# Patient Record
Sex: FEMALE | Race: WHITE | NOT HISPANIC OR LATINO | Employment: FULL TIME | URBAN - METROPOLITAN AREA
[De-identification: names, ages, dates, MRNs, and addresses within clinical notes are randomized per-mention and may not be internally consistent; named-entity substitution may affect disease eponyms.]

---

## 2022-11-27 ENCOUNTER — HOSPITAL ENCOUNTER (INPATIENT)
Facility: HOSPITAL | Age: 62
LOS: 4 days | Discharge: HOME HEALTH | DRG: 638 | End: 2022-12-01
Attending: EMERGENCY MEDICINE | Admitting: STUDENT IN AN ORGANIZED HEALTH CARE EDUCATION/TRAINING PROGRAM
Payer: COMMERCIAL

## 2022-11-27 DIAGNOSIS — E11.10 DIABETIC KETOACIDOSIS WITHOUT COMA ASSOCIATED WITH TYPE 2 DIABETES MELLITUS: Primary | ICD-10-CM

## 2022-11-27 DIAGNOSIS — I95.9 HYPOTENSION: ICD-10-CM

## 2022-11-27 DIAGNOSIS — R53.81 DEBILITY: ICD-10-CM

## 2022-11-27 DIAGNOSIS — Z90.710 STATUS POST HYSTERECTOMY: ICD-10-CM

## 2022-11-27 PROBLEM — E11.9 TYPE 2 DIABETES MELLITUS WITHOUT COMPLICATION, WITHOUT LONG-TERM CURRENT USE OF INSULIN: Status: ACTIVE | Noted: 2022-11-27

## 2022-11-27 PROBLEM — I10 ESSENTIAL HYPERTENSION: Status: ACTIVE | Noted: 2022-11-27

## 2022-11-27 PROBLEM — C54.1 ENDOMETRIAL ADENOCARCINOMA: Status: ACTIVE | Noted: 2022-11-27

## 2022-11-27 PROBLEM — R47.81 SLURRED SPEECH: Status: ACTIVE | Noted: 2022-11-27

## 2022-11-27 LAB
ABO + RH BLD: NORMAL
ALBUMIN SERPL BCP-MCNC: 4 G/DL (ref 3.5–5.2)
ALLENS TEST: ABNORMAL
ALLENS TEST: NORMAL
ALP SERPL-CCNC: 79 U/L (ref 55–135)
ALT SERPL W/O P-5'-P-CCNC: 22 U/L (ref 10–44)
ANION GAP SERPL CALC-SCNC: 19 MMOL/L (ref 8–16)
ANION GAP SERPL CALC-SCNC: 19 MMOL/L (ref 8–16)
ANION GAP SERPL CALC-SCNC: 22 MMOL/L (ref 8–16)
AST SERPL-CCNC: 9 U/L (ref 10–40)
B-OH-BUTYR BLD STRIP-SCNC: 5.3 MMOL/L (ref 0–0.5)
BACTERIA #/AREA URNS HPF: ABNORMAL /HPF
BASOPHILS # BLD AUTO: 0.05 K/UL (ref 0–0.2)
BASOPHILS NFR BLD: 0.4 % (ref 0–1.9)
BILIRUB SERPL-MCNC: 0.3 MG/DL (ref 0.1–1)
BILIRUB UR QL STRIP: NEGATIVE
BLD GP AB SCN CELLS X3 SERPL QL: NORMAL
BNP SERPL-MCNC: 10 PG/ML (ref 0–99)
BUN SERPL-MCNC: 25 MG/DL (ref 6–20)
BUN SERPL-MCNC: 30 MG/DL (ref 6–20)
BUN SERPL-MCNC: 32 MG/DL (ref 6–30)
CALCIUM SERPL-MCNC: 10.5 MG/DL (ref 8.7–10.5)
CALCIUM SERPL-MCNC: 11.4 MG/DL (ref 8.7–10.5)
CHLORIDE SERPL-SCNC: 113 MMOL/L (ref 95–110)
CHLORIDE SERPL-SCNC: 116 MMOL/L (ref 95–110)
CHLORIDE SERPL-SCNC: 120 MMOL/L (ref 95–110)
CLARITY UR: CLEAR
CO2 SERPL-SCNC: 10 MMOL/L (ref 23–29)
CO2 SERPL-SCNC: 7 MMOL/L (ref 23–29)
COLOR UR: YELLOW
CREAT SERPL-MCNC: 0.5 MG/DL (ref 0.5–1.4)
CREAT SERPL-MCNC: 1 MG/DL (ref 0.5–1.4)
CREAT SERPL-MCNC: 1.5 MG/DL (ref 0.5–1.4)
DIFFERENTIAL METHOD: ABNORMAL
EOSINOPHIL # BLD AUTO: 0.5 K/UL (ref 0–0.5)
EOSINOPHIL NFR BLD: 3.6 % (ref 0–8)
ERYTHROCYTE [DISTWIDTH] IN BLOOD BY AUTOMATED COUNT: 13.6 % (ref 11.5–14.5)
EST. GFR  (NO RACE VARIABLE): 40 ML/MIN/1.73 M^2
EST. GFR  (NO RACE VARIABLE): >60 ML/MIN/1.73 M^2
GLUCOSE SERPL-MCNC: 286 MG/DL (ref 70–110)
GLUCOSE SERPL-MCNC: 482 MG/DL (ref 70–110)
GLUCOSE SERPL-MCNC: 556 MG/DL (ref 70–110)
GLUCOSE UR QL STRIP: ABNORMAL
HCO3 UR-SCNC: 6.8 MMOL/L (ref 24–28)
HCT VFR BLD AUTO: 53.1 % (ref 37–48.5)
HCT VFR BLD CALC: 57 %PCV (ref 36–54)
HGB BLD-MCNC: 17.3 G/DL (ref 12–16)
HGB UR QL STRIP: ABNORMAL
HYALINE CASTS #/AREA URNS LPF: 4 /LPF
IMM GRANULOCYTES # BLD AUTO: 0.11 K/UL (ref 0–0.04)
IMM GRANULOCYTES NFR BLD AUTO: 0.9 % (ref 0–0.5)
INR PPP: 1 (ref 0.8–1.2)
KETONES UR QL STRIP: ABNORMAL
LACTATE SERPL-SCNC: 1.7 MMOL/L (ref 0.5–2.2)
LACTATE SERPL-SCNC: 2.2 MMOL/L (ref 0.5–2.2)
LDH SERPL L TO P-CCNC: 1.94 MMOL/L (ref 0.5–2.2)
LEUKOCYTE ESTERASE UR QL STRIP: ABNORMAL
LYMPHOCYTES # BLD AUTO: 0.9 K/UL (ref 1–4.8)
LYMPHOCYTES NFR BLD: 6.6 % (ref 18–48)
MAGNESIUM SERPL-MCNC: 2.4 MG/DL (ref 1.6–2.6)
MCH RBC QN AUTO: 30.5 PG (ref 27–31)
MCHC RBC AUTO-ENTMCNC: 32.6 G/DL (ref 32–36)
MCV RBC AUTO: 94 FL (ref 82–98)
MICROSCOPIC COMMENT: ABNORMAL
MONOCYTES # BLD AUTO: 0.7 K/UL (ref 0.3–1)
MONOCYTES NFR BLD: 5.4 % (ref 4–15)
NEUTROPHILS # BLD AUTO: 10.7 K/UL (ref 1.8–7.7)
NEUTROPHILS NFR BLD: 83.1 % (ref 38–73)
NITRITE UR QL STRIP: NEGATIVE
NRBC BLD-RTO: 0 /100 WBC
PCO2 BLDA: 23.3 MMHG (ref 35–45)
PH SMN: 7.08 [PH] (ref 7.35–7.45)
PH UR STRIP: 6 [PH] (ref 5–8)
PHOSPHATE SERPL-MCNC: 1.1 MG/DL (ref 2.7–4.5)
PLATELET # BLD AUTO: 406 K/UL (ref 150–450)
PMV BLD AUTO: 9.3 FL (ref 9.2–12.9)
PO2 BLDA: 25 MMHG (ref 40–60)
POC BE: -22 MMOL/L
POC IONIZED CALCIUM: 1.6 MMOL/L (ref 1.06–1.42)
POC SATURATED O2: 27 % (ref 95–100)
POC TCO2 (MEASURED): 9 MMOL/L (ref 23–29)
POC TCO2: 8 MMOL/L (ref 24–29)
POCT GLUCOSE: 330 MG/DL (ref 70–110)
POCT GLUCOSE: 407 MG/DL (ref 70–110)
POCT GLUCOSE: 432 MG/DL (ref 70–110)
POCT GLUCOSE: 433 MG/DL (ref 70–110)
POTASSIUM BLD-SCNC: 3.5 MMOL/L (ref 3.5–5.1)
POTASSIUM SERPL-SCNC: 3.3 MMOL/L (ref 3.5–5.1)
POTASSIUM SERPL-SCNC: 3.5 MMOL/L (ref 3.5–5.1)
PROCALCITONIN SERPL IA-MCNC: 0.05 NG/ML
PROT SERPL-MCNC: 8.3 G/DL (ref 6–8.4)
PROT UR QL STRIP: ABNORMAL
PROTHROMBIN TIME: 10.6 SEC (ref 9–12.5)
RBC # BLD AUTO: 5.67 M/UL (ref 4–5.4)
RBC #/AREA URNS HPF: 6 /HPF (ref 0–4)
SAMPLE: ABNORMAL
SAMPLE: ABNORMAL
SAMPLE: NORMAL
SITE: ABNORMAL
SITE: NORMAL
SODIUM BLD-SCNC: 143 MMOL/L (ref 136–145)
SODIUM SERPL-SCNC: 142 MMOL/L (ref 136–145)
SODIUM SERPL-SCNC: 145 MMOL/L (ref 136–145)
SP GR UR STRIP: 1.03 (ref 1–1.03)
SQUAMOUS #/AREA URNS HPF: 4 /HPF
TROPONIN I SERPL DL<=0.01 NG/ML-MCNC: 0.01 NG/ML (ref 0–0.03)
URN SPEC COLLECT METH UR: ABNORMAL
UROBILINOGEN UR STRIP-ACNC: NEGATIVE EU/DL
WBC # BLD AUTO: 12.91 K/UL (ref 3.9–12.7)
WBC #/AREA URNS HPF: 15 /HPF (ref 0–5)
YEAST URNS QL MICRO: ABNORMAL

## 2022-11-27 PROCEDURE — 25000003 PHARM REV CODE 250: Performed by: EMERGENCY MEDICINE

## 2022-11-27 PROCEDURE — 87086 URINE CULTURE/COLONY COUNT: CPT | Performed by: EMERGENCY MEDICINE

## 2022-11-27 PROCEDURE — 93010 EKG 12-LEAD: ICD-10-PCS | Mod: ,,, | Performed by: INTERNAL MEDICINE

## 2022-11-27 PROCEDURE — 83605 ASSAY OF LACTIC ACID: CPT

## 2022-11-27 PROCEDURE — 84100 ASSAY OF PHOSPHORUS: CPT | Performed by: STUDENT IN AN ORGANIZED HEALTH CARE EDUCATION/TRAINING PROGRAM

## 2022-11-27 PROCEDURE — 82803 BLOOD GASES ANY COMBINATION: CPT

## 2022-11-27 PROCEDURE — 82010 KETONE BODYS QUAN: CPT | Performed by: EMERGENCY MEDICINE

## 2022-11-27 PROCEDURE — 63600175 PHARM REV CODE 636 W HCPCS: Performed by: STUDENT IN AN ORGANIZED HEALTH CARE EDUCATION/TRAINING PROGRAM

## 2022-11-27 PROCEDURE — 82330 ASSAY OF CALCIUM: CPT

## 2022-11-27 PROCEDURE — 93005 ELECTROCARDIOGRAM TRACING: CPT

## 2022-11-27 PROCEDURE — 83605 ASSAY OF LACTIC ACID: CPT | Performed by: EMERGENCY MEDICINE

## 2022-11-27 PROCEDURE — 84145 PROCALCITONIN (PCT): CPT | Performed by: EMERGENCY MEDICINE

## 2022-11-27 PROCEDURE — 85610 PROTHROMBIN TIME: CPT | Performed by: EMERGENCY MEDICINE

## 2022-11-27 PROCEDURE — 99900035 HC TECH TIME PER 15 MIN (STAT)

## 2022-11-27 PROCEDURE — 80053 COMPREHEN METABOLIC PANEL: CPT | Performed by: EMERGENCY MEDICINE

## 2022-11-27 PROCEDURE — 87040 BLOOD CULTURE FOR BACTERIA: CPT | Mod: 59 | Performed by: EMERGENCY MEDICINE

## 2022-11-27 PROCEDURE — 87088 URINE BACTERIA CULTURE: CPT | Performed by: EMERGENCY MEDICINE

## 2022-11-27 PROCEDURE — 85025 COMPLETE CBC W/AUTO DIFF WBC: CPT | Performed by: EMERGENCY MEDICINE

## 2022-11-27 PROCEDURE — 83735 ASSAY OF MAGNESIUM: CPT | Performed by: STUDENT IN AN ORGANIZED HEALTH CARE EDUCATION/TRAINING PROGRAM

## 2022-11-27 PROCEDURE — 85014 HEMATOCRIT: CPT

## 2022-11-27 PROCEDURE — 80048 BASIC METABOLIC PNL TOTAL CA: CPT | Mod: XB | Performed by: STUDENT IN AN ORGANIZED HEALTH CARE EDUCATION/TRAINING PROGRAM

## 2022-11-27 PROCEDURE — 84132 ASSAY OF SERUM POTASSIUM: CPT

## 2022-11-27 PROCEDURE — 25000003 PHARM REV CODE 250: Performed by: STUDENT IN AN ORGANIZED HEALTH CARE EDUCATION/TRAINING PROGRAM

## 2022-11-27 PROCEDURE — 93010 ELECTROCARDIOGRAM REPORT: CPT | Mod: ,,, | Performed by: INTERNAL MEDICINE

## 2022-11-27 PROCEDURE — 87106 FUNGI IDENTIFICATION YEAST: CPT | Performed by: EMERGENCY MEDICINE

## 2022-11-27 PROCEDURE — 63600175 PHARM REV CODE 636 W HCPCS: Performed by: EMERGENCY MEDICINE

## 2022-11-27 PROCEDURE — S5010 5% DEXTROSE AND 0.45% SALINE: HCPCS | Performed by: STUDENT IN AN ORGANIZED HEALTH CARE EDUCATION/TRAINING PROGRAM

## 2022-11-27 PROCEDURE — 81000 URINALYSIS NONAUTO W/SCOPE: CPT | Performed by: EMERGENCY MEDICINE

## 2022-11-27 PROCEDURE — 83036 HEMOGLOBIN GLYCOSYLATED A1C: CPT | Performed by: EMERGENCY MEDICINE

## 2022-11-27 PROCEDURE — 82565 ASSAY OF CREATININE: CPT

## 2022-11-27 PROCEDURE — 20000000 HC ICU ROOM

## 2022-11-27 PROCEDURE — 84295 ASSAY OF SERUM SODIUM: CPT

## 2022-11-27 PROCEDURE — 84484 ASSAY OF TROPONIN QUANT: CPT | Performed by: EMERGENCY MEDICINE

## 2022-11-27 PROCEDURE — 83605 ASSAY OF LACTIC ACID: CPT | Mod: 91 | Performed by: STUDENT IN AN ORGANIZED HEALTH CARE EDUCATION/TRAINING PROGRAM

## 2022-11-27 PROCEDURE — 99291 CRITICAL CARE FIRST HOUR: CPT | Mod: 25

## 2022-11-27 PROCEDURE — 86850 RBC ANTIBODY SCREEN: CPT | Performed by: EMERGENCY MEDICINE

## 2022-11-27 PROCEDURE — 83880 ASSAY OF NATRIURETIC PEPTIDE: CPT | Performed by: EMERGENCY MEDICINE

## 2022-11-27 PROCEDURE — 96374 THER/PROPH/DIAG INJ IV PUSH: CPT

## 2022-11-27 RX ORDER — DEXTROSE MONOHYDRATE 100 MG/ML
INJECTION, SOLUTION INTRAVENOUS
Status: DISCONTINUED | OUTPATIENT
Start: 2022-11-27 | End: 2022-11-29

## 2022-11-27 RX ORDER — DEXTROSE MONOHYDRATE AND SODIUM CHLORIDE 5; .45 G/100ML; G/100ML
INJECTION, SOLUTION INTRAVENOUS CONTINUOUS
Status: ACTIVE | OUTPATIENT
Start: 2022-11-27 | End: 2022-11-29

## 2022-11-27 RX ORDER — SODIUM CHLORIDE 9 MG/ML
INJECTION, SOLUTION INTRAVENOUS CONTINUOUS
Status: DISCONTINUED | OUTPATIENT
Start: 2022-11-27 | End: 2022-11-27

## 2022-11-27 RX ORDER — POTASSIUM CHLORIDE 7.45 MG/ML
10 INJECTION INTRAVENOUS
Status: DISPENSED | OUTPATIENT
Start: 2022-11-27 | End: 2022-11-27

## 2022-11-27 RX ORDER — SODIUM CHLORIDE 9 MG/ML
INJECTION, SOLUTION INTRAVENOUS CONTINUOUS
Status: DISCONTINUED | OUTPATIENT
Start: 2022-11-27 | End: 2022-11-30

## 2022-11-27 RX ORDER — METFORMIN HYDROCHLORIDE 500 MG/1
500 TABLET, EXTENDED RELEASE ORAL
COMMUNITY
Start: 2022-11-16 | End: 2023-11-16

## 2022-11-27 RX ORDER — EMPAGLIFLOZIN 10 MG/1
10 TABLET, FILM COATED ORAL
COMMUNITY
Start: 2022-11-16

## 2022-11-27 RX ORDER — ASPIRIN 81 MG/1
81 TABLET ORAL DAILY
Status: DISCONTINUED | OUTPATIENT
Start: 2022-11-28 | End: 2022-12-01 | Stop reason: HOSPADM

## 2022-11-27 RX ORDER — VANCOMYCIN HCL IN 5 % DEXTROSE 1G/250ML
1000 PLASTIC BAG, INJECTION (ML) INTRAVENOUS
Status: DISCONTINUED | OUTPATIENT
Start: 2022-11-28 | End: 2022-11-28

## 2022-11-27 RX ORDER — POTASSIUM CHLORIDE 20 MEQ/1
40 TABLET, EXTENDED RELEASE ORAL ONCE
Status: COMPLETED | OUTPATIENT
Start: 2022-11-27 | End: 2022-11-27

## 2022-11-27 RX ORDER — INSULIN GLARGINE 100 [IU]/ML
15 INJECTION, SOLUTION SUBCUTANEOUS
COMMUNITY
Start: 2022-11-16 | End: 2022-12-26

## 2022-11-27 RX ORDER — SODIUM CHLORIDE 0.9 % (FLUSH) 0.9 %
10 SYRINGE (ML) INJECTION
Status: DISCONTINUED | OUTPATIENT
Start: 2022-11-27 | End: 2022-12-01 | Stop reason: HOSPADM

## 2022-11-27 RX ORDER — ATORVASTATIN CALCIUM 40 MG/1
40 TABLET, FILM COATED ORAL DAILY
Status: DISCONTINUED | OUTPATIENT
Start: 2022-11-27 | End: 2022-12-01 | Stop reason: HOSPADM

## 2022-11-27 RX ORDER — LOSARTAN POTASSIUM 50 MG/1
50 TABLET ORAL
COMMUNITY
Start: 2022-10-12

## 2022-11-27 RX ORDER — METOPROLOL SUCCINATE 50 MG/1
TABLET, EXTENDED RELEASE ORAL
COMMUNITY
Start: 2022-10-17

## 2022-11-27 RX ORDER — MUPIROCIN 20 MG/G
OINTMENT TOPICAL 2 TIMES DAILY
Status: DISCONTINUED | OUTPATIENT
Start: 2022-11-27 | End: 2022-12-01 | Stop reason: HOSPADM

## 2022-11-27 RX ORDER — ENOXAPARIN SODIUM 100 MG/ML
40 INJECTION SUBCUTANEOUS EVERY 24 HOURS
Status: DISCONTINUED | OUTPATIENT
Start: 2022-11-27 | End: 2022-12-01 | Stop reason: HOSPADM

## 2022-11-27 RX ORDER — NAPROXEN SODIUM 220 MG/1
324 TABLET, FILM COATED ORAL ONCE
Status: COMPLETED | OUTPATIENT
Start: 2022-11-27 | End: 2022-11-27

## 2022-11-27 RX ADMIN — POTASSIUM CHLORIDE 40 MEQ: 1500 TABLET, EXTENDED RELEASE ORAL at 05:11

## 2022-11-27 RX ADMIN — ENOXAPARIN SODIUM 40 MG: 40 INJECTION SUBCUTANEOUS at 07:11

## 2022-11-27 RX ADMIN — POTASSIUM CHLORIDE 10 MEQ: 10 INJECTION, SOLUTION INTRAVENOUS at 06:11

## 2022-11-27 RX ADMIN — ATORVASTATIN CALCIUM 40 MG: 40 TABLET, FILM COATED ORAL at 05:11

## 2022-11-27 RX ADMIN — SODIUM CHLORIDE 7 UNITS/HR: 9 INJECTION, SOLUTION INTRAVENOUS at 02:11

## 2022-11-27 RX ADMIN — INSULIN HUMAN 10 UNITS: 100 INJECTION, SOLUTION PARENTERAL at 02:11

## 2022-11-27 RX ADMIN — DEXTROSE AND SODIUM CHLORIDE: 5; .45 INJECTION, SOLUTION INTRAVENOUS at 09:11

## 2022-11-27 RX ADMIN — VANCOMYCIN HYDROCHLORIDE 2000 MG: 500 INJECTION, POWDER, LYOPHILIZED, FOR SOLUTION INTRAVENOUS at 03:11

## 2022-11-27 RX ADMIN — PIPERACILLIN SODIUM AND TAZOBACTAM SODIUM 4.5 G: 4; .5 INJECTION, POWDER, LYOPHILIZED, FOR SOLUTION INTRAVENOUS at 02:11

## 2022-11-27 RX ADMIN — SODIUM CHLORIDE: 0.9 INJECTION, SOLUTION INTRAVENOUS at 03:11

## 2022-11-27 RX ADMIN — POTASSIUM CHLORIDE 10 MEQ: 10 INJECTION, SOLUTION INTRAVENOUS at 03:11

## 2022-11-27 RX ADMIN — ASPIRIN 81 MG CHEWABLE TABLET 324 MG: 81 TABLET CHEWABLE at 05:11

## 2022-11-27 RX ADMIN — POTASSIUM CHLORIDE 10 MEQ: 10 INJECTION, SOLUTION INTRAVENOUS at 07:11

## 2022-11-27 RX ADMIN — SODIUM CHLORIDE, SODIUM LACTATE, POTASSIUM CHLORIDE, AND CALCIUM CHLORIDE 2244 ML: .6; .31; .03; .02 INJECTION, SOLUTION INTRAVENOUS at 02:11

## 2022-11-27 RX ADMIN — POTASSIUM CHLORIDE 10 MEQ: 10 INJECTION, SOLUTION INTRAVENOUS at 08:11

## 2022-11-27 RX ADMIN — PIPERACILLIN SODIUM AND TAZOBACTAM SODIUM 4.5 G: 4; .5 INJECTION, POWDER, LYOPHILIZED, FOR SOLUTION INTRAVENOUS at 11:11

## 2022-11-27 RX ADMIN — POTASSIUM CHLORIDE 10 MEQ: 10 INJECTION, SOLUTION INTRAVENOUS at 09:11

## 2022-11-27 NOTE — SUBJECTIVE & OBJECTIVE
Past Medical History:   Diagnosis Date    Diabetic ketoacidosis without coma associated with type 2 diabetes mellitus 11/27/2022    Endometrial adenocarcinoma 11/27/2022    Type 2 diabetes mellitus without complication, without long-term current use of insulin 11/27/2022       No past surgical history on file.    Review of patient's allergies indicates:  No Known Allergies    No current facility-administered medications on file prior to encounter.     Current Outpatient Medications on File Prior to Encounter   Medication Sig    insulin glargine 100 units/mL SubQ pen Inject 15 Units into the skin.    JARDIANCE 10 mg tablet Take 10 mg by mouth.    losartan (COZAAR) 50 MG tablet Take 50 mg by mouth.    metFORMIN (GLUCOPHAGE-XR) 500 MG ER 24hr tablet Take 500 mg by mouth.    metoprolol succinate (TOPROL-XL) 50 MG 24 hr tablet TAKE 1 TABLET BY MOUTH EVERY MORNING AND AT BEDTIME     Family History    None       Tobacco Use    Smoking status: Not on file    Smokeless tobacco: Not on file   Substance and Sexual Activity    Alcohol use: Not on file    Drug use: Not on file    Sexual activity: Not on file     Review of Systems   Constitutional:  Positive for activity change, appetite change and fatigue. Negative for fever.   HENT:  Negative for sore throat and trouble swallowing.    Eyes:  Negative for photophobia and visual disturbance.   Respiratory:  Negative for chest tightness and shortness of breath.    Cardiovascular:  Negative for chest pain, palpitations and leg swelling.   Gastrointestinal:  Positive for constipation and nausea. Negative for abdominal distention, abdominal pain, blood in stool, diarrhea and vomiting.   Endocrine: Positive for polydipsia and polyuria.   Genitourinary:  Positive for frequency. Negative for difficulty urinating, dysuria and hematuria.   Musculoskeletal:  Negative for neck pain and neck stiffness.   Skin:  Negative for pallor and rash.   Allergic/Immunologic: Negative for  immunocompromised state.   Neurological:  Positive for headaches. Negative for dizziness, seizures, syncope and facial asymmetry.   Psychiatric/Behavioral:  Positive for confusion and decreased concentration. Negative for agitation and behavioral problems.    All other systems reviewed and are negative.  Objective:     Vital Signs (Most Recent):  Temp: 97.4 °F (36.3 °C) (11/27/22 1425)  Pulse: 108 (11/27/22 1309)  Resp: 20 (11/27/22 1309)  BP: 112/78 (11/27/22 1309)  SpO2: 100 % (11/27/22 1309) Vital Signs (24h Range):  Temp:  [97.4 °F (36.3 °C)] 97.4 °F (36.3 °C)  Pulse:  [108] 108  Resp:  [20] 20  SpO2:  [100 %] 100 %  BP: (112)/(78) 112/78     Weight: 74.8 kg (165 lb)  There is no height or weight on file to calculate BMI.    Physical Exam  Vitals and nursing note reviewed.   Constitutional:       General: She is in acute distress.      Appearance: She is ill-appearing and toxic-appearing. She is not diaphoretic.   HENT:      Head: Normocephalic and atraumatic.      Mouth/Throat:      Mouth: Mucous membranes are dry.      Pharynx: No oropharyngeal exudate or posterior oropharyngeal erythema.   Eyes:      General: No scleral icterus.     Pupils: Pupils are equal, round, and reactive to light.   Neck:      Thyroid: No thyromegaly.   Cardiovascular:      Rate and Rhythm: Regular rhythm. Tachycardia present.      Heart sounds: No murmur heard.  Pulmonary:      Effort: Pulmonary effort is normal.      Breath sounds: Normal breath sounds. No stridor. No wheezing or rales.   Abdominal:      General: There is no distension.      Palpations: Abdomen is soft. There is no mass.      Tenderness: There is abdominal tenderness. There is no guarding.   Musculoskeletal:         General: Normal range of motion.      Cervical back: Normal range of motion and neck supple. No rigidity.      Right lower leg: No edema.      Left lower leg: No edema.   Lymphadenopathy:      Cervical: No cervical adenopathy.   Skin:     General: Skin is  warm and dry.      Capillary Refill: Capillary refill takes less than 2 seconds.      Coloration: Skin is not jaundiced.      Findings: No bruising.   Neurological:      Mental Status: She is oriented to person, place, and time. Mental status is at baseline. She is lethargic.      Cranial Nerves: No cranial nerve deficit.      Motor: No weakness.   Psychiatric:         Mood and Affect: Mood normal.         Behavior: Behavior normal.         CRANIAL NERVES     CN III, IV, VI   Pupils are equal, round, and reactive to light.         Recent Results (from the past 24 hour(s))   ISTAT PROCEDURE    Collection Time: 11/27/22  2:04 PM   Result Value Ref Range    POC PH 7.075 (L) 7.35 - 7.45    POC PCO2 23.3 (L) 35 - 45 mmHg    POC PO2 25 (L) 40 - 60 mmHg    POC HCO3 6.8 (L) 24 - 28 mmol/L    POC BE -22 -2 to 2 mmol/L    POC SATURATED O2 27 (L) 95 - 100 %    POC TCO2 8 (L) 24 - 29 mmol/L    Sample VENOUS     Site Other     Allens Test N/A    ISTAT Lactate    Collection Time: 11/27/22  2:04 PM   Result Value Ref Range    POC Lactate 1.94 0.5 - 2.2 mmol/L    Sample VENOUS     Site Other     Allens Test N/A    ISTAT PROCEDURE    Collection Time: 11/27/22  2:07 PM   Result Value Ref Range    POC Glucose 482 (HH) 70 - 110 mg/dL    POC BUN 32 (H) 6 - 30 mg/dL    POC Creatinine 0.5 0.5 - 1.4 mg/dL    POC Sodium 143 136 - 145 mmol/L    POC Potassium 3.5 3.5 - 5.1 mmol/L    POC Chloride 120 (H) 95 - 110 mmol/L    POC TCO2 (MEASURED) 9 (L) 23 - 29 mmol/L    POC Anion Gap 19 (H) 8 - 16 mmol/L    POC Ionized Calcium 1.60 (H) 1.06 - 1.42 mmol/L    POC Hematocrit 57 (H) 36 - 54 %PCV    Sample VENOUS    CBC auto differential    Collection Time: 11/27/22  2:12 PM   Result Value Ref Range    WBC 12.91 (H) 3.90 - 12.70 K/uL    RBC 5.67 (H) 4.00 - 5.40 M/uL    Hemoglobin 17.3 (H) 12.0 - 16.0 g/dL    Hematocrit 53.1 (H) 37.0 - 48.5 %    MCV 94 82 - 98 fL    MCH 30.5 27.0 - 31.0 pg    MCHC 32.6 32.0 - 36.0 g/dL    RDW 13.6 11.5 - 14.5 %     Platelets 406 150 - 450 K/uL    MPV 9.3 9.2 - 12.9 fL    Immature Granulocytes 0.9 (H) 0.0 - 0.5 %    Gran # (ANC) 10.7 (H) 1.8 - 7.7 K/uL    Immature Grans (Abs) 0.11 (H) 0.00 - 0.04 K/uL    Lymph # 0.9 (L) 1.0 - 4.8 K/uL    Mono # 0.7 0.3 - 1.0 K/uL    Eos # 0.5 0.0 - 0.5 K/uL    Baso # 0.05 0.00 - 0.20 K/uL    nRBC 0 0 /100 WBC    Gran % 83.1 (H) 38.0 - 73.0 %    Lymph % 6.6 (L) 18.0 - 48.0 %    Mono % 5.4 4.0 - 15.0 %    Eosinophil % 3.6 0.0 - 8.0 %    Basophil % 0.4 0.0 - 1.9 %    Differential Method Automated    Troponin I    Collection Time: 11/27/22  2:12 PM   Result Value Ref Range    Troponin I 0.009 0.000 - 0.026 ng/mL   Brain natriuretic peptide    Collection Time: 11/27/22  2:12 PM   Result Value Ref Range    BNP 10 0 - 99 pg/mL   Protime-INR    Collection Time: 11/27/22  2:12 PM   Result Value Ref Range    Prothrombin Time 10.6 9.0 - 12.5 sec    INR 1.0 0.8 - 1.2       Microbiology Results (last 7 days)       Procedure Component Value Units Date/Time    Blood culture x two cultures. Draw prior to antibiotics. [[id number]] Collected: 11/27/22 1413    Order Status: Sent Specimen: Blood from Peripheral, Forearm, Left Updated: 11/27/22 1442    Blood culture x two cultures. Draw prior to antibiotics. [[id number]] Collected: 11/27/22 1413    Order Status: Sent Specimen: Blood from Peripheral, Antecubital, Left Updated: 11/27/22 1442             Imaging Results              X-Ray Chest AP Portable (Final result)  Result time 11/27/22 14:44:27      Final result by Jay Mccracken IV, MD (11/27/22 14:44:27)                   Impression:      No acute intrathoracic abnormality.      Electronically signed by: Jay Mccracken  Date:    11/27/2022  Time:    14:44               Narrative:    EXAMINATION:  XR CHEST AP PORTABLE    CLINICAL HISTORY:  Sepsis;    TECHNIQUE:  Single frontal view of the chest was performed.    COMPARISON:  None    FINDINGS:  Mediastinal structures are midline.  Normal mediastinal and  hilar contours.  Normal cardiac silhouette.    Lungs are symmetrically expanded.  No focal consolidation or mass.  No pneumothorax.  No significant pleural fluid.    Osseous structures appear intact.

## 2022-11-27 NOTE — ASSESSMENT & PLAN NOTE
-Admitted to inpatient status  -Noted recent diagnosis of DM by PCP and was hoping to be able to control with diet, metformin and jardiance.  -On admit glucose 556, anion gap 22, beta-hydroxybutyrate 5.3, pH 7.0 and bicarb 7.  -A1c 11.9  -She was monitored with accu check q1h and bmp q4h  -She has been treated with IV fluids and insulin drip.  -Anion gap now down to 16, but CO2 still only 10 and she is significantly tachycardic.  -Continue care in ICU insulin drip until acidosis improves further.  Check VBG, d-dimer and lactic acid.  -Will require insulin at discharge.

## 2022-11-27 NOTE — ASSESSMENT & PLAN NOTE
Patient's FSGs are uncontrolled due to hyperglycemia on current medication regimen.  Last A1c reviewed-   Lab Results   Component Value Date    HGBA1C 11.9 (H) 11/16/2022     Most recent fingerstick glucose reviewed- No results for input(s): POCTGLUCOSE in the last 24 hours.    Antihyperglycemics (From admission, onward)    Start     Stop Route Frequency Ordered    11/27/22 1430  insulin regular in 0.9 % NaCl 100 unit/100 mL (1 unit/mL) infusion  (INSULIN INFUSIONS)        Question:  Enter initial dose from Infusion Protocol Chart (Units/hr):  Answer:  7    -- IV Continuous 11/27/22 1425        Hold Oral hypoglycemics while patient is in the hospital.  · Insulin infusion initiated   IF Glucose is >250, run 0.9% NS at 125 mL/hr along with insulin infusion.  · IF Glucose is < 250, run D5+0.45% NS at 125 mL/hr along with insulin infusion.  · Q.4 BMP, Ph, Mg checks  · Q 1 hour glucose checks

## 2022-11-27 NOTE — ED PROVIDER NOTES
Encounter Date: 11/27/2022       History     Chief Complaint   Patient presents with    Fatigue     Presents to the ED via EMS with c/o weakness, hypotension, slurred speech, that started early this AM. Reports having hysterectomy x 6 days ago. Has no had a BM since. EMS reports patient initially had mottling on bilat legs upon arrival to scene. Mottling has improved after being in Trendelenberg and localized to only knees. Patient denies vaginal bleeding or abdominal pain.     Hyperglycemia     Has not taken her DM medication since having sx. . Reports frequent urination and increased thirst.      HPI  This patient presents to the emergency room with hypotension slurred speech.  The patient had a hysterectomy at [facility] 6 days ago.  She is not had a bowel movement since discharge.  The patient was found hypotensive with mottling of the lower extremities on the scene.  The patient denies any abdominal pain nausea vomiting or diarrhea there is no chest pain pressure tightness.  She is otherwise well.  She is feeling weak.  She was discovered to have a blood sugar of 459.  Review of patient's allergies indicates:  No Known Allergies  Past Medical History:   Diagnosis Date    Diabetic ketoacidosis without coma associated with type 2 diabetes mellitus 11/27/2022     No past surgical history on file.  No family history on file.     Review of Systems  The patient was questioned specifically with regard to the following.  General: Fever, chills, sweats. Neuro: Headache. Eyes: eye problems. ENT: Ear pain, sore throat. Cardiovascular: Chest pain. Respiratory: Cough, shortness of breath. Gastrointestinal: Abdominal pain, vomiting, diarrhea. Genitourinary: Painful urination.  Musculoskeletal: Arm and leg problems. Skin: Rash.  The review of systems was negative except for the following:  Generalized weakness, mottling, low blood pressure, constipation, rectal discomfort,  Physical Exam     Initial Vitals   BP  Pulse Resp Temp SpO2   11/27/22 1309 11/27/22 1309 11/27/22 1309 11/27/22 1425 11/27/22 1309   112/78 108 20 97.4 °F (36.3 °C) 100 %      MAP       --                Physical Exam  The patient was examined specifically for the following:   General:No significant distress, Good color, Warm and dry. Head and neck:Scalp atraumatic, Neck supple. Neurological:Appropriate conversation, Gross motor deficits. Eyes:Conjugate gaze, Clear corneas. ENT: No epistaxis. Cardiac: Regular rate and rhythm, Grossly normal heart tones. Pulmonary: Wheezing, Rales. Gastrointestinal: Abdominal tenderness, Abdominal distention. Musculoskeletal: Extremity deformity, Apparent pain with range of motion of the joints. Skin: Rash.   The findings on examination were normal except for the following:  The abdomen is nontender the patient is pale.  There is some mottling about the knees.  Vital signs are stable.  The patient is cool to touch.  I can not feel pulses in the feet.  ED Course   Critical Care    Date/Time: 11/27/2022 2:30 PM  Performed by: Cb Jaeger MD  Authorized by: Cb Jaeger MD   Direct patient critical care time: 22 minutes  Additional history critical care time: 11 minutes  Ordering / reviewing critical care time: 11 minutes  Documentation critical care time: 11 minutes  Consulting other physicians critical care time: 5 minutes  Total critical care time (exclusive of procedural time) : 60 minutes  Critical care time was exclusive of separately billable procedures and treating other patients and teaching time.  Critical care was necessary to treat or prevent imminent or life-threatening deterioration of the following conditions: CNS failure or compromise, metabolic crisis and shock.  Critical care was time spent personally by me on the following activities: development of treatment plan with patient or surrogate, discussions with primary provider, examination of patient, ordering and performing treatments and  interventions, ordering and review of laboratory studies, obtaining history from patient or surrogate, ordering and review of radiographic studies, pulse oximetry, re-evaluation of patient's condition and review of old charts.      Labs Reviewed   CBC W/ AUTO DIFFERENTIAL - Abnormal; Notable for the following components:       Result Value    WBC 12.91 (*)     RBC 5.67 (*)     Hemoglobin 17.3 (*)     Hematocrit 53.1 (*)     Immature Granulocytes 0.9 (*)     Gran # (ANC) 10.7 (*)     Immature Grans (Abs) 0.11 (*)     Lymph # 0.9 (*)     Gran % 83.1 (*)     Lymph % 6.6 (*)     All other components within normal limits   COMPREHENSIVE METABOLIC PANEL - Abnormal; Notable for the following components:    Potassium 3.3 (*)     Chloride 113 (*)     CO2 7 (*)     Glucose 556 (*)     BUN 30 (*)     Creatinine 1.5 (*)     Calcium 11.4 (*)     AST 9 (*)     Anion Gap 22 (*)     eGFR 40 (*)     All other components within normal limits    Narrative:        CARBON DIOXIDE AND GLUCOSE critical result(s) called and verbal   readback obtained from LUCY FUENTES by LB1 11/27/2022 15:35   URINALYSIS, REFLEX TO URINE CULTURE - Abnormal; Notable for the following components:    Protein, UA 1+ (*)     Glucose, UA 4+ (*)     Ketones, UA 3+ (*)     Occult Blood UA 2+ (*)     Leukocytes, UA 1+ (*)     All other components within normal limits    Narrative:     Specimen Source->Urine   URINALYSIS MICROSCOPIC - Abnormal; Notable for the following components:    RBC, UA 6 (*)     WBC, UA 15 (*)     Yeast, UA Moderate (*)     Hyaline Casts, UA 4 (*)     All other components within normal limits    Narrative:     Specimen Source->Urine   ISTAT PROCEDURE - Abnormal; Notable for the following components:    POC PH 7.075 (*)     POC PCO2 23.3 (*)     POC PO2 25 (*)     POC HCO3 6.8 (*)     POC SATURATED O2 27 (*)     POC TCO2 8 (*)     All other components within normal limits   ISTAT PROCEDURE - Abnormal; Notable for the following components:     POC Glucose 482 (*)     POC BUN 32 (*)     POC Chloride 120 (*)     POC TCO2 (MEASURED) 9 (*)     POC Anion Gap 19 (*)     POC Ionized Calcium 1.60 (*)     POC Hematocrit 57 (*)     All other components within normal limits   CULTURE, BLOOD   CULTURE, BLOOD   CULTURE, URINE   LACTIC ACID, PLASMA   TROPONIN I   PROCALCITONIN   B-TYPE NATRIURETIC PEPTIDE   PROTIME-INR   HEMOGLOBIN A1C   BETA - HYDROXYBUTYRATE, SERUM   TYPE & SCREEN   ISTAT LACTATE   ISTAT CHEM8   POCT GLUCOSE MONITORING CONTINUOUS     EKG Readings: (Independently Interpreted)   This patient is in a sinus tachycardia with a heart rate of 105.  There are low voltage QRS complexes.  There is poor R-wave progression across precordium.  The patient has Q-waves inferiorly.  There is no definite evidence of acute myocardial infarction or malignant arrhythmia.  There are some nonspecific ST segment changes.     Imaging Results              X-Ray Chest AP Portable (Final result)  Result time 11/27/22 14:44:27      Final result by Jay Mccracken IV, MD (11/27/22 14:44:27)                   Impression:      No acute intrathoracic abnormality.      Electronically signed by: Jay Mccracken  Date:    11/27/2022  Time:    14:44               Narrative:    EXAMINATION:  XR CHEST AP PORTABLE    CLINICAL HISTORY:  Sepsis;    TECHNIQUE:  Single frontal view of the chest was performed.    COMPARISON:  None    FINDINGS:  Mediastinal structures are midline.  Normal mediastinal and hilar contours.  Normal cardiac silhouette.    Lungs are symmetrically expanded.  No focal consolidation or mass.  No pneumothorax.  No significant pleural fluid.    Osseous structures appear intact.                                    Medical decision making:  Given the above this patient presents to the emergency with altered mental status, slurred speech, low blood pressure, extremity mottling.  She has a history of elevated blood sugars.  She had a hysterectomy 6 days ago.  She denies  abdominal pain.  She is actually conversational.  The patient looks sick.  Her pH is 7.025.  Her blood sugar is 459.  Anion gap is elevated.  I believe this patient had diabetic ketoacidosis.  I will consult ICU for admission.       Medications   piperacillin-tazobactam 4.5 g in dextrose 5 % 100 mL IVPB (ready to mix system) (0 g Intravenous Stopped 11/27/22 1503)   insulin regular in 0.9 % NaCl 100 unit/100 mL (1 unit/mL) infusion (7 Units/hr Intravenous New Bag 11/27/22 1435)   dextrose 5 % and 0.45 % NaCl infusion (has no administration in time range)   0.9%  NaCl infusion (has no administration in time range)   potassium chloride 10 mEq in 100 mL IVPB (10 mEq Intravenous New Bag 11/27/22 1534)   vancomycin - pharmacy to dose (has no administration in time range)   sodium chloride 0.9% flush 10 mL (has no administration in time range)   dextrose 10 % infusion (has no administration in time range)   dextrose 10 % infusion (has no administration in time range)   vancomycin (VANCOCIN) 2,000 mg in dextrose 5 % 500 mL IVPB (2,000 mg Intravenous New Bag 11/27/22 1536)   lactated ringers bolus 2,244 mL (2,244 mLs Intravenous New Bag 11/27/22 1401)   insulin regular injection 10 Units 0.1 mL (10 Units Intravenous Given 11/27/22 1426)                              Clinical Impression:   Final diagnoses:  [I95.9] Hypotension  [E11.10] Diabetic ketoacidosis without coma associated with type 2 diabetes mellitus (Primary)  [Z90.710] Status post hysterectomy      ED Disposition Condition    Admit                 Cb Jaeger MD  11/27/22 8444

## 2022-11-27 NOTE — ASSESSMENT & PLAN NOTE
· Newly Diabetic and lonely on Metformin and Juard for a couple weeks.  · A1c 11.9 on admission  · Will need education and insulin scripts at discharge  · Would add statin and continue metformin as well

## 2022-11-27 NOTE — PHARMACY MED REC
"Admission Medication History     The home medication history was taken by Cande Ardon.    You may go to "Admission" then "Reconcile Home Medications" tabs to review and/or act upon these items.     The home medication list has been updated by the Pharmacy department.   Please read ALL comments highlighted in yellow.   Please address this information as you see fit.    Feel free to contact us if you have any questions or require assistance.    Medications listed below were obtained from: Patient/family and Analytic software- [facility] and added to home medication:   Jarbrandonance   Basaglar   Losartan   Metformin   Metoprolol    The medication reconciliation was completed by the patient's bedside.      Cande Ardon  [phone]                      .        "

## 2022-11-27 NOTE — HPI
Aleyda Benitez is a 62 y.o. female who DM2, HTN, Obesity, OP, and Endometrial Adenocarcinoma has no past medical history on file, presented to the ED with Generalized Fatigue and High Home Glucoses.  Patient was just diagnosed with endometrial carcinoma a couple weeks ago, she had her uterus removed 6 days ago.  Does not sound like she is had a PET scan yet, lymph node biopsy still pending pathology at this point.  Patient was unaware she was diabetic before a couple weeks ago, when doing workup for the surgery.  She was starting on two oral diabetic medications.  Patient was unable to get sugar down at her house, and was brought into the emergency room after she was progressively worsening, including generalized weakness, hypotension, slurred speech.   recognized the slurring of speech last night, but got progressively worse this morning.  She has not had a bowel movement since her procedure 6 days ago.  She has polyuria and polydipsia.  Patient denies any abdominal discomfort associated with the procedure, denies protrusion.  She denies chest pain or shortness of breath.    In the ED, patient was found to be in DKA, with a pH of 7.075, bicarb 7, anion gap 22 and beta hydroxybutyrate 5.3.  She is severely dehydrated, with concentrated CBC.  Glucose 556.  Patient given fluids started insulin drip and placed in ICU.    Home meds:  Losartan 50 mg once daily  Metoprolol succ 50 mg once daily  Metformin 500 mg q24 once daily  Empagliflozin 10 mg once  Glargine 15U nightly (?)  On Care everywhere  Medroxyprogesterone 10 mg

## 2022-11-27 NOTE — H&P
[facility] Medicine  History & Physical    Patient Name: Aleyda Benitez  MRN: [id number]  Patient Class: IP- Inpatient  Admission Date: 11/27/2022  Attending Physician: Raphael Dial MD   Primary Care Provider: No primary care provider on file.         Patient information was obtained from patient, spouse/SO, relative(s), past medical records and ER records.     Subjective:     Principal Problem:Diabetic ketoacidosis without coma associated with type 2 diabetes mellitus    Chief Complaint:   Chief Complaint   Patient presents with    Fatigue     Presents to the ED via EMS with c/o weakness, hypotension, slurred speech, that started early this AM. Reports having hysterectomy x 6 days ago. Has no had a BM since. EMS reports patient initially had mottling on bilat legs upon arrival to scene. Mottling has improved after being in Trendelenberg and localized to only knees. Patient denies vaginal bleeding or abdominal pain.     Hyperglycemia     Has not taken her DM medication since having sx. . Reports frequent urination and increased thirst.         HPI:   Aleyda Benitez is a 62 y.o. female who DM2, HTN, Obesity, OP, and Endometrial Adenocarcinoma has no past medical history on file, presented to the ED with Generalized Fatigue and High Home Glucoses.  Patient was just diagnosed with endometrial carcinoma a couple weeks ago, she had her uterus removed 6 days ago.  Does not sound like she is had a PET scan yet, lymph node biopsy still pending pathology at this point.  Patient was unaware she was diabetic before a couple weeks ago, when doing workup for the surgery.  She was starting on two oral diabetic medications.  Patient was unable to get sugar down at her house, and was brought into the emergency room after she was progressively worsening, including generalized weakness, hypotension, slurred speech.   recognized the slurring of speech last night, but got progressively worse  this morning.  She has not had a bowel movement since her procedure 6 days ago.  She has polyuria and polydipsia.  Patient denies any abdominal discomfort associated with the procedure, denies protrusion.  She denies chest pain or shortness of breath.    In the ED, patient was found to be in DKA, with a pH of 7.075, bicarb 7, anion gap 22 and beta hydroxybutyrate 5.3.  She is severely dehydrated, with concentrated CBC.  Glucose 556.  Patient given fluids started insulin drip and placed in ICU.    Home meds:  Losartan 50 mg once daily  Metoprolol succ 50 mg once daily  Metformin 500 mg q24 once daily  Empagliflozin 10 mg once  Glargine 15U nightly (?)  On Care everywhere  Medroxyprogesterone 10 mg       Past Medical History:   Diagnosis Date    Diabetic ketoacidosis without coma associated with type 2 diabetes mellitus 11/27/2022    Endometrial adenocarcinoma 11/27/2022    Type 2 diabetes mellitus without complication, without long-term current use of insulin 11/27/2022       No past surgical history on file.    Review of patient's allergies indicates:  No Known Allergies    No current facility-administered medications on file prior to encounter.     Current Outpatient Medications on File Prior to Encounter   Medication Sig    insulin glargine 100 units/mL SubQ pen Inject 15 Units into the skin.    JARDIANCE 10 mg tablet Take 10 mg by mouth.    losartan (COZAAR) 50 MG tablet Take 50 mg by mouth.    metFORMIN (GLUCOPHAGE-XR) 500 MG ER 24hr tablet Take 500 mg by mouth.    metoprolol succinate (TOPROL-XL) 50 MG 24 hr tablet TAKE 1 TABLET BY MOUTH EVERY MORNING AND AT BEDTIME     Family History    None       Tobacco Use    Smoking status: Not on file    Smokeless tobacco: Not on file   Substance and Sexual Activity    Alcohol use: Not on file    Drug use: Not on file    Sexual activity: Not on file     Review of Systems   Constitutional:  Positive for activity change, appetite change and fatigue. Negative for fever.    HENT:  Negative for sore throat and trouble swallowing.    Eyes:  Negative for photophobia and visual disturbance.   Respiratory:  Negative for chest tightness and shortness of breath.    Cardiovascular:  Negative for chest pain, palpitations and leg swelling.   Gastrointestinal:  Positive for constipation and nausea. Negative for abdominal distention, abdominal pain, blood in stool, diarrhea and vomiting.   Endocrine: Positive for polydipsia and polyuria.   Genitourinary:  Positive for frequency. Negative for difficulty urinating, dysuria and hematuria.   Musculoskeletal:  Negative for neck pain and neck stiffness.   Skin:  Negative for pallor and rash.   Allergic/Immunologic: Negative for immunocompromised state.   Neurological:  Positive for headaches. Negative for dizziness, seizures, syncope and facial asymmetry.   Psychiatric/Behavioral:  Positive for confusion and decreased concentration. Negative for agitation and behavioral problems.    All other systems reviewed and are negative.  Objective:     Vital Signs (Most Recent):  Temp: 97.4 °F (36.3 °C) (11/27/22 1425)  Pulse: 108 (11/27/22 1309)  Resp: 20 (11/27/22 1309)  BP: 112/78 (11/27/22 1309)  SpO2: 100 % (11/27/22 1309) Vital Signs (24h Range):  Temp:  [97.4 °F (36.3 °C)] 97.4 °F (36.3 °C)  Pulse:  [108] 108  Resp:  [20] 20  SpO2:  [100 %] 100 %  BP: (112)/(78) 112/78     Weight: 74.8 kg (165 lb)  There is no height or weight on file to calculate BMI.    Physical Exam  Vitals and nursing note reviewed.   Constitutional:       General: She is in acute distress.      Appearance: She is ill-appearing and toxic-appearing. She is not diaphoretic.   HENT:      Head: Normocephalic and atraumatic.      Mouth/Throat:      Mouth: Mucous membranes are dry.      Pharynx: No oropharyngeal exudate or posterior oropharyngeal erythema.   Eyes:      General: No scleral icterus.     Pupils: Pupils are equal, round, and reactive to light.   Neck:      Thyroid: No  thyromegaly.   Cardiovascular:      Rate and Rhythm: Regular rhythm. Tachycardia present.      Heart sounds: No murmur heard.  Pulmonary:      Effort: Pulmonary effort is normal.      Breath sounds: Normal breath sounds. No stridor. No wheezing or rales.   Abdominal:      General: There is no distension.      Palpations: Abdomen is soft. There is no mass.      Tenderness: There is abdominal tenderness. There is no guarding.   Musculoskeletal:         General: Normal range of motion.      Cervical back: Normal range of motion and neck supple. No rigidity.      Right lower leg: No edema.      Left lower leg: No edema.   Lymphadenopathy:      Cervical: No cervical adenopathy.   Skin:     General: Skin is warm and dry.      Capillary Refill: Capillary refill takes less than 2 seconds.      Coloration: Skin is not jaundiced.      Findings: No bruising.   Neurological:      Mental Status: She is oriented to person, place, and time. Mental status is at baseline. She is lethargic.      Cranial Nerves: No cranial nerve deficit.      Motor: No weakness.   Psychiatric:         Mood and Affect: Mood normal.         Behavior: Behavior normal.         CRANIAL NERVES     CN III, IV, VI   Pupils are equal, round, and reactive to light.         Recent Results (from the past 24 hour(s))   ISTAT PROCEDURE    Collection Time: 11/27/22  2:04 PM   Result Value Ref Range    POC PH 7.075 (L) 7.35 - 7.45    POC PCO2 23.3 (L) 35 - 45 mmHg    POC PO2 25 (L) 40 - 60 mmHg    POC HCO3 6.8 (L) 24 - 28 mmol/L    POC BE -22 -2 to 2 mmol/L    POC SATURATED O2 27 (L) 95 - 100 %    POC TCO2 8 (L) 24 - 29 mmol/L    Sample VENOUS     Site Other     Allens Test N/A    ISTAT Lactate    Collection Time: 11/27/22  2:04 PM   Result Value Ref Range    POC Lactate 1.94 0.5 - 2.2 mmol/L    Sample VENOUS     Site Other     Allens Test N/A    ISTAT PROCEDURE    Collection Time: 11/27/22  2:07 PM   Result Value Ref Range    POC Glucose 482 (HH) 70 - 110 mg/dL     POC BUN 32 (H) 6 - 30 mg/dL    POC Creatinine 0.5 0.5 - 1.4 mg/dL    POC Sodium 143 136 - 145 mmol/L    POC Potassium 3.5 3.5 - 5.1 mmol/L    POC Chloride 120 (H) 95 - 110 mmol/L    POC TCO2 (MEASURED) 9 (L) 23 - 29 mmol/L    POC Anion Gap 19 (H) 8 - 16 mmol/L    POC Ionized Calcium 1.60 (H) 1.06 - 1.42 mmol/L    POC Hematocrit 57 (H) 36 - 54 %PCV    Sample VENOUS    CBC auto differential    Collection Time: 11/27/22  2:12 PM   Result Value Ref Range    WBC 12.91 (H) 3.90 - 12.70 K/uL    RBC 5.67 (H) 4.00 - 5.40 M/uL    Hemoglobin 17.3 (H) 12.0 - 16.0 g/dL    Hematocrit 53.1 (H) 37.0 - 48.5 %    MCV 94 82 - 98 fL    MCH 30.5 27.0 - 31.0 pg    MCHC 32.6 32.0 - 36.0 g/dL    RDW 13.6 11.5 - 14.5 %    Platelets 406 150 - 450 K/uL    MPV 9.3 9.2 - 12.9 fL    Immature Granulocytes 0.9 (H) 0.0 - 0.5 %    Gran # (ANC) 10.7 (H) 1.8 - 7.7 K/uL    Immature Grans (Abs) 0.11 (H) 0.00 - 0.04 K/uL    Lymph # 0.9 (L) 1.0 - 4.8 K/uL    Mono # 0.7 0.3 - 1.0 K/uL    Eos # 0.5 0.0 - 0.5 K/uL    Baso # 0.05 0.00 - 0.20 K/uL    nRBC 0 0 /100 WBC    Gran % 83.1 (H) 38.0 - 73.0 %    Lymph % 6.6 (L) 18.0 - 48.0 %    Mono % 5.4 4.0 - 15.0 %    Eosinophil % 3.6 0.0 - 8.0 %    Basophil % 0.4 0.0 - 1.9 %    Differential Method Automated    Troponin I    Collection Time: 11/27/22  2:12 PM   Result Value Ref Range    Troponin I 0.009 0.000 - 0.026 ng/mL   Brain natriuretic peptide    Collection Time: 11/27/22  2:12 PM   Result Value Ref Range    BNP 10 0 - 99 pg/mL   Protime-INR    Collection Time: 11/27/22  2:12 PM   Result Value Ref Range    Prothrombin Time 10.6 9.0 - 12.5 sec    INR 1.0 0.8 - 1.2       Microbiology Results (last 7 days)       Procedure Component Value Units Date/Time    Blood culture x two cultures. Draw prior to antibiotics. [[id number]] Collected: 11/27/22 1413    Order Status: Sent Specimen: Blood from Peripheral, Forearm, Left Updated: 11/27/22 1442    Blood culture x two cultures. Draw prior to antibiotics. [[id number]]  Collected: 11/27/22 1413    Order Status: Sent Specimen: Blood from Peripheral, Antecubital, Left Updated: 11/27/22 1442             Imaging Results              X-Ray Chest AP Portable (Final result)  Result time 11/27/22 14:44:27      Final result by Jay Mccracken IV, MD (11/27/22 14:44:27)                   Impression:      No acute intrathoracic abnormality.      Electronically signed by: Jay Mccracken  Date:    11/27/2022  Time:    14:44               Narrative:    EXAMINATION:  XR CHEST AP PORTABLE    CLINICAL HISTORY:  Sepsis;    TECHNIQUE:  Single frontal view of the chest was performed.    COMPARISON:  None    FINDINGS:  Mediastinal structures are midline.  Normal mediastinal and hilar contours.  Normal cardiac silhouette.    Lungs are symmetrically expanded.  No focal consolidation or mass.  No pneumothorax.  No significant pleural fluid.    Osseous structures appear intact.                                          Assessment/Plan:     * Diabetic ketoacidosis without coma associated with type 2 diabetes mellitus  Patient's FSGs are uncontrolled due to hyperglycemia on current medication regimen.  Last A1c reviewed-   Lab Results   Component Value Date    HGBA1C 11.9 (H) 11/16/2022     Most recent fingerstick glucose reviewed-   Recent Labs   Lab 11/27/22  1541   POCTGLUCOSE 433*       Antihyperglycemics (From admission, onward)      Start     Stop Route Frequency Ordered    11/27/22 1430  insulin regular in 0.9 % NaCl 100 unit/100 mL (1 unit/mL) infusion  (INSULIN INFUSIONS)        Question:  Enter initial dose from Infusion Protocol Chart (Units/hr):  Answer:  7    -- IV Continuous 11/27/22 1425          Hold Oral hypoglycemics while patient is in the hospital.  pH of 7.075, bicarb 7, anion gap 22 and beta hydroxybutyrate 5.3.    She is severely dehydrated, with concentrated CBC.  Glucose 556.   Insulin infusion initiated  IF Glucose is >250, run 0.9% NS at 125 mL/hr along with insulin  infusion.  IF Glucose is < 250, run D5+0.45% NS at 125 mL/hr along with insulin infusion.  Q.4 BMP, Ph, Mg checks  Q 1 hour glucose checks      Slurred speech  Concerning slurring, that did not seem consistent with DKA although not impossible  Her tongue was severely dry, so perhaps this was a major cause  However she was unable to perform a couple multiple step thought processes, like spelling world backwards  And she is a higher risk for stroke/clots, given cancer diagnosis  Generalized weakness on exam but no focal neurological deficit  Out of window for tPA, as  states it began yesterday  Therefore will get an MRI to rule out small stroke/TIA  Will consult stroke if pathological finding results      Endometrial adenocarcinoma  Still awaiting to see if LN had cancer/mets, f/u path outpt      Essential hypertension  Soft BP  Hold home losartan      Type 2 diabetes mellitus without complication, without long-term current use of insulin  Newly Diabetic and lonely on Metformin and Juard for a couple weeks.  A1c 11.9 on admission  Will need education and insulin scripts at discharge  Would add statin and continue metformin as well    VTE Risk Mitigation (From admission, onward)           Ordered     IP VTE LOW RISK PATIENT  Once         11/27/22 2311                       Raphael Dial MD  Department of Hospital Medicine   [facility] [place] - Emergency Dept

## 2022-11-28 PROBLEM — R82.90 ABNORMAL URINALYSIS: Status: ACTIVE | Noted: 2022-11-28

## 2022-11-28 PROBLEM — R53.81 DEBILITY: Status: ACTIVE | Noted: 2022-11-28

## 2022-11-28 PROBLEM — R00.0 TACHYCARDIA: Status: ACTIVE | Noted: 2022-11-28

## 2022-11-28 LAB
ANION GAP SERPL CALC-SCNC: 11 MMOL/L (ref 8–16)
ANION GAP SERPL CALC-SCNC: 12 MMOL/L (ref 8–16)
ANION GAP SERPL CALC-SCNC: 12 MMOL/L (ref 8–16)
ANION GAP SERPL CALC-SCNC: 13 MMOL/L (ref 8–16)
ANION GAP SERPL CALC-SCNC: 13 MMOL/L (ref 8–16)
ANION GAP SERPL CALC-SCNC: 16 MMOL/L (ref 8–16)
BASOPHILS # BLD AUTO: 0.01 K/UL (ref 0–0.2)
BASOPHILS NFR BLD: 0.1 % (ref 0–1.9)
BUN SERPL-MCNC: 16 MG/DL (ref 6–20)
BUN SERPL-MCNC: 17 MG/DL (ref 6–20)
BUN SERPL-MCNC: 18 MG/DL (ref 6–20)
BUN SERPL-MCNC: 19 MG/DL (ref 6–20)
BUN SERPL-MCNC: 20 MG/DL (ref 6–20)
BUN SERPL-MCNC: 23 MG/DL (ref 6–20)
CALCIUM SERPL-MCNC: 10 MG/DL (ref 8.7–10.5)
CALCIUM SERPL-MCNC: 9.5 MG/DL (ref 8.7–10.5)
CALCIUM SERPL-MCNC: 9.7 MG/DL (ref 8.7–10.5)
CALCIUM SERPL-MCNC: 9.7 MG/DL (ref 8.7–10.5)
CHLORIDE SERPL-SCNC: 116 MMOL/L (ref 95–110)
CHLORIDE SERPL-SCNC: 116 MMOL/L (ref 95–110)
CHLORIDE SERPL-SCNC: 117 MMOL/L (ref 95–110)
CHLORIDE SERPL-SCNC: 118 MMOL/L (ref 95–110)
CO2 SERPL-SCNC: 10 MMOL/L (ref 23–29)
CO2 SERPL-SCNC: 13 MMOL/L (ref 23–29)
CO2 SERPL-SCNC: 16 MMOL/L (ref 23–29)
CREAT SERPL-MCNC: 0.7 MG/DL (ref 0.5–1.4)
CREAT SERPL-MCNC: 0.8 MG/DL (ref 0.5–1.4)
CREAT SERPL-MCNC: 0.9 MG/DL (ref 0.5–1.4)
D DIMER PPP IA.FEU-MCNC: 1.1 MG/L FEU
DIFFERENTIAL METHOD: ABNORMAL
EOSINOPHIL # BLD AUTO: 0 K/UL (ref 0–0.5)
EOSINOPHIL NFR BLD: 0 % (ref 0–8)
ERYTHROCYTE [DISTWIDTH] IN BLOOD BY AUTOMATED COUNT: 13.3 % (ref 11.5–14.5)
EST. GFR  (NO RACE VARIABLE): >60 ML/MIN/1.73 M^2
ESTIMATED AVG GLUCOSE: 255 MG/DL (ref 68–131)
GLUCOSE SERPL-MCNC: 233 MG/DL (ref 70–110)
GLUCOSE SERPL-MCNC: 253 MG/DL (ref 70–110)
GLUCOSE SERPL-MCNC: 265 MG/DL (ref 70–110)
GLUCOSE SERPL-MCNC: 275 MG/DL (ref 70–110)
GLUCOSE SERPL-MCNC: 275 MG/DL (ref 70–110)
GLUCOSE SERPL-MCNC: 285 MG/DL (ref 70–110)
HBA1C MFR BLD: 10.5 % (ref 4–5.6)
HCT VFR BLD AUTO: 41.7 % (ref 37–48.5)
HGB BLD-MCNC: 14 G/DL (ref 12–16)
IMM GRANULOCYTES # BLD AUTO: 0.05 K/UL (ref 0–0.04)
IMM GRANULOCYTES NFR BLD AUTO: 0.4 % (ref 0–0.5)
LACTATE SERPL-SCNC: 2 MMOL/L (ref 0.5–2.2)
LYMPHOCYTES # BLD AUTO: 0.6 K/UL (ref 1–4.8)
LYMPHOCYTES NFR BLD: 5.5 % (ref 18–48)
MAGNESIUM SERPL-MCNC: 2.1 MG/DL (ref 1.6–2.6)
MAGNESIUM SERPL-MCNC: 2.2 MG/DL (ref 1.6–2.6)
MAGNESIUM SERPL-MCNC: 2.3 MG/DL (ref 1.6–2.6)
MAGNESIUM SERPL-MCNC: 2.4 MG/DL (ref 1.6–2.6)
MCH RBC QN AUTO: 29.9 PG (ref 27–31)
MCHC RBC AUTO-ENTMCNC: 33.6 G/DL (ref 32–36)
MCV RBC AUTO: 89 FL (ref 82–98)
MONOCYTES # BLD AUTO: 0.8 K/UL (ref 0.3–1)
MONOCYTES NFR BLD: 7 % (ref 4–15)
NEUTROPHILS # BLD AUTO: 9.9 K/UL (ref 1.8–7.7)
NEUTROPHILS NFR BLD: 87 % (ref 38–73)
NRBC BLD-RTO: 0 /100 WBC
PHOSPHATE SERPL-MCNC: 1.1 MG/DL (ref 2.7–4.5)
PHOSPHATE SERPL-MCNC: 1.2 MG/DL (ref 2.7–4.5)
PHOSPHATE SERPL-MCNC: 1.3 MG/DL (ref 2.7–4.5)
PHOSPHATE SERPL-MCNC: 1.3 MG/DL (ref 2.7–4.5)
PHOSPHATE SERPL-MCNC: 1.8 MG/DL (ref 2.7–4.5)
PHOSPHATE SERPL-MCNC: <1 MG/DL (ref 2.7–4.5)
PLATELET # BLD AUTO: 326 K/UL (ref 150–450)
PMV BLD AUTO: 9 FL (ref 9.2–12.9)
POCT GLUCOSE: 181 MG/DL (ref 70–110)
POCT GLUCOSE: 189 MG/DL (ref 70–110)
POCT GLUCOSE: 191 MG/DL (ref 70–110)
POCT GLUCOSE: 207 MG/DL (ref 70–110)
POCT GLUCOSE: 211 MG/DL (ref 70–110)
POCT GLUCOSE: 211 MG/DL (ref 70–110)
POCT GLUCOSE: 216 MG/DL (ref 70–110)
POCT GLUCOSE: 217 MG/DL (ref 70–110)
POCT GLUCOSE: 227 MG/DL (ref 70–110)
POCT GLUCOSE: 234 MG/DL (ref 70–110)
POCT GLUCOSE: 242 MG/DL (ref 70–110)
POCT GLUCOSE: 244 MG/DL (ref 70–110)
POCT GLUCOSE: 246 MG/DL (ref 70–110)
POCT GLUCOSE: 247 MG/DL (ref 70–110)
POCT GLUCOSE: 248 MG/DL (ref 70–110)
POCT GLUCOSE: 249 MG/DL (ref 70–110)
POCT GLUCOSE: 253 MG/DL (ref 70–110)
POCT GLUCOSE: 256 MG/DL (ref 70–110)
POCT GLUCOSE: 259 MG/DL (ref 70–110)
POCT GLUCOSE: 260 MG/DL (ref 70–110)
POCT GLUCOSE: 263 MG/DL (ref 70–110)
POCT GLUCOSE: 268 MG/DL (ref 70–110)
POCT GLUCOSE: 270 MG/DL (ref 70–110)
POCT GLUCOSE: 279 MG/DL (ref 70–110)
POCT GLUCOSE: 281 MG/DL (ref 70–110)
POCT GLUCOSE: 295 MG/DL (ref 70–110)
POTASSIUM SERPL-SCNC: 3.5 MMOL/L (ref 3.5–5.1)
POTASSIUM SERPL-SCNC: 3.6 MMOL/L (ref 3.5–5.1)
POTASSIUM SERPL-SCNC: 3.7 MMOL/L (ref 3.5–5.1)
POTASSIUM SERPL-SCNC: 3.8 MMOL/L (ref 3.5–5.1)
POTASSIUM SERPL-SCNC: 4.2 MMOL/L (ref 3.5–5.1)
POTASSIUM SERPL-SCNC: 4.2 MMOL/L (ref 3.5–5.1)
PROCALCITONIN SERPL IA-MCNC: 0.06 NG/ML
RBC # BLD AUTO: 4.69 M/UL (ref 4–5.4)
SODIUM SERPL-SCNC: 142 MMOL/L (ref 136–145)
SODIUM SERPL-SCNC: 144 MMOL/L (ref 136–145)
SODIUM SERPL-SCNC: 144 MMOL/L (ref 136–145)
SODIUM SERPL-SCNC: 145 MMOL/L (ref 136–145)
WBC # BLD AUTO: 11.42 K/UL (ref 3.9–12.7)

## 2022-11-28 PROCEDURE — 92610 EVALUATE SWALLOWING FUNCTION: CPT

## 2022-11-28 PROCEDURE — 80048 BASIC METABOLIC PNL TOTAL CA: CPT | Mod: 91 | Performed by: STUDENT IN AN ORGANIZED HEALTH CARE EDUCATION/TRAINING PROGRAM

## 2022-11-28 PROCEDURE — 94761 N-INVAS EAR/PLS OXIMETRY MLT: CPT

## 2022-11-28 PROCEDURE — 25500020 PHARM REV CODE 255: Performed by: HOSPITALIST

## 2022-11-28 PROCEDURE — 36415 COLL VENOUS BLD VENIPUNCTURE: CPT | Performed by: STUDENT IN AN ORGANIZED HEALTH CARE EDUCATION/TRAINING PROGRAM

## 2022-11-28 PROCEDURE — 84145 PROCALCITONIN (PCT): CPT | Performed by: HOSPITALIST

## 2022-11-28 PROCEDURE — 97165 OT EVAL LOW COMPLEX 30 MIN: CPT

## 2022-11-28 PROCEDURE — 25000003 PHARM REV CODE 250: Performed by: HOSPITALIST

## 2022-11-28 PROCEDURE — 83605 ASSAY OF LACTIC ACID: CPT | Performed by: HOSPITALIST

## 2022-11-28 PROCEDURE — 84100 ASSAY OF PHOSPHORUS: CPT | Mod: 91 | Performed by: STUDENT IN AN ORGANIZED HEALTH CARE EDUCATION/TRAINING PROGRAM

## 2022-11-28 PROCEDURE — 97161 PT EVAL LOW COMPLEX 20 MIN: CPT

## 2022-11-28 PROCEDURE — 85025 COMPLETE CBC W/AUTO DIFF WBC: CPT | Performed by: STUDENT IN AN ORGANIZED HEALTH CARE EDUCATION/TRAINING PROGRAM

## 2022-11-28 PROCEDURE — 36415 COLL VENOUS BLD VENIPUNCTURE: CPT | Performed by: HOSPITALIST

## 2022-11-28 PROCEDURE — S5010 5% DEXTROSE AND 0.45% SALINE: HCPCS | Performed by: STUDENT IN AN ORGANIZED HEALTH CARE EDUCATION/TRAINING PROGRAM

## 2022-11-28 PROCEDURE — 97530 THERAPEUTIC ACTIVITIES: CPT

## 2022-11-28 PROCEDURE — 85379 FIBRIN DEGRADATION QUANT: CPT | Performed by: HOSPITALIST

## 2022-11-28 PROCEDURE — 83735 ASSAY OF MAGNESIUM: CPT | Performed by: STUDENT IN AN ORGANIZED HEALTH CARE EDUCATION/TRAINING PROGRAM

## 2022-11-28 PROCEDURE — 83735 ASSAY OF MAGNESIUM: CPT | Mod: 91 | Performed by: STUDENT IN AN ORGANIZED HEALTH CARE EDUCATION/TRAINING PROGRAM

## 2022-11-28 PROCEDURE — 63600175 PHARM REV CODE 636 W HCPCS: Performed by: STUDENT IN AN ORGANIZED HEALTH CARE EDUCATION/TRAINING PROGRAM

## 2022-11-28 PROCEDURE — 25000003 PHARM REV CODE 250: Performed by: EMERGENCY MEDICINE

## 2022-11-28 PROCEDURE — 25000003 PHARM REV CODE 250: Performed by: STUDENT IN AN ORGANIZED HEALTH CARE EDUCATION/TRAINING PROGRAM

## 2022-11-28 PROCEDURE — 20000000 HC ICU ROOM

## 2022-11-28 PROCEDURE — 63600175 PHARM REV CODE 636 W HCPCS: Performed by: EMERGENCY MEDICINE

## 2022-11-28 PROCEDURE — 97535 SELF CARE MNGMENT TRAINING: CPT

## 2022-11-28 RX ORDER — ACETAMINOPHEN 325 MG/1
650 TABLET ORAL EVERY 6 HOURS PRN
Status: DISCONTINUED | OUTPATIENT
Start: 2022-11-28 | End: 2022-11-29

## 2022-11-28 RX ORDER — CALCIUM GLUCONATE 20 MG/ML
2 INJECTION, SOLUTION INTRAVENOUS
Status: DISCONTINUED | OUTPATIENT
Start: 2022-11-28 | End: 2022-11-28

## 2022-11-28 RX ORDER — LOPERAMIDE HYDROCHLORIDE 2 MG/1
2 CAPSULE ORAL 4 TIMES DAILY PRN
Status: DISCONTINUED | OUTPATIENT
Start: 2022-11-28 | End: 2022-12-01 | Stop reason: HOSPADM

## 2022-11-28 RX ORDER — MAGNESIUM SULFATE HEPTAHYDRATE 40 MG/ML
4 INJECTION, SOLUTION INTRAVENOUS
Status: DISCONTINUED | OUTPATIENT
Start: 2022-11-28 | End: 2022-11-28

## 2022-11-28 RX ORDER — CALCIUM GLUCONATE 20 MG/ML
3 INJECTION, SOLUTION INTRAVENOUS
Status: DISCONTINUED | OUTPATIENT
Start: 2022-11-28 | End: 2022-11-28

## 2022-11-28 RX ORDER — POTASSIUM CHLORIDE 7.45 MG/ML
40 INJECTION INTRAVENOUS
Status: DISCONTINUED | OUTPATIENT
Start: 2022-11-28 | End: 2022-11-28

## 2022-11-28 RX ORDER — POTASSIUM CHLORIDE 7.45 MG/ML
80 INJECTION INTRAVENOUS
Status: DISCONTINUED | OUTPATIENT
Start: 2022-11-28 | End: 2022-11-28

## 2022-11-28 RX ORDER — POTASSIUM CHLORIDE 7.45 MG/ML
60 INJECTION INTRAVENOUS
Status: DISCONTINUED | OUTPATIENT
Start: 2022-11-28 | End: 2022-11-28

## 2022-11-28 RX ORDER — MAGNESIUM SULFATE HEPTAHYDRATE 40 MG/ML
2 INJECTION, SOLUTION INTRAVENOUS
Status: DISCONTINUED | OUTPATIENT
Start: 2022-11-28 | End: 2022-11-28

## 2022-11-28 RX ORDER — ONDANSETRON 2 MG/ML
4 INJECTION INTRAMUSCULAR; INTRAVENOUS EVERY 4 HOURS PRN
Status: DISCONTINUED | OUTPATIENT
Start: 2022-11-28 | End: 2022-12-01 | Stop reason: HOSPADM

## 2022-11-28 RX ORDER — CALCIUM GLUCONATE 20 MG/ML
1 INJECTION, SOLUTION INTRAVENOUS
Status: DISCONTINUED | OUTPATIENT
Start: 2022-11-28 | End: 2022-11-28

## 2022-11-28 RX ORDER — TALC
6 POWDER (GRAM) TOPICAL NIGHTLY PRN
Status: DISCONTINUED | OUTPATIENT
Start: 2022-11-28 | End: 2022-12-01 | Stop reason: HOSPADM

## 2022-11-28 RX ORDER — NYSTATIN 100000 [USP'U]/ML
500000 SUSPENSION ORAL 4 TIMES DAILY
Status: DISCONTINUED | OUTPATIENT
Start: 2022-11-28 | End: 2022-12-01 | Stop reason: HOSPADM

## 2022-11-28 RX ADMIN — SODIUM CHLORIDE: 0.9 INJECTION, SOLUTION INTRAVENOUS at 08:11

## 2022-11-28 RX ADMIN — MUPIROCIN: 20 OINTMENT TOPICAL at 08:11

## 2022-11-28 RX ADMIN — NYSTATIN 500000 UNITS: 100000 SUSPENSION ORAL at 08:11

## 2022-11-28 RX ADMIN — ENOXAPARIN SODIUM 40 MG: 40 INJECTION SUBCUTANEOUS at 05:11

## 2022-11-28 RX ADMIN — DEXTROSE AND SODIUM CHLORIDE: 5; .45 INJECTION, SOLUTION INTRAVENOUS at 05:11

## 2022-11-28 RX ADMIN — SODIUM CHLORIDE 3.5 UNITS/HR: 9 INJECTION, SOLUTION INTRAVENOUS at 02:11

## 2022-11-28 RX ADMIN — ASPIRIN 81 MG: 81 TABLET, DELAYED RELEASE ORAL at 08:11

## 2022-11-28 RX ADMIN — IOHEXOL 75 ML: 350 INJECTION, SOLUTION INTRAVENOUS at 06:11

## 2022-11-28 RX ADMIN — POTASSIUM PHOSPHATE, MONOBASIC 500 MG: 500 TABLET, SOLUBLE ORAL at 02:11

## 2022-11-28 RX ADMIN — DEXTROSE AND SODIUM CHLORIDE: 5; .45 INJECTION, SOLUTION INTRAVENOUS at 10:11

## 2022-11-28 RX ADMIN — PIPERACILLIN SODIUM AND TAZOBACTAM SODIUM 4.5 G: 4; .5 INJECTION, POWDER, LYOPHILIZED, FOR SOLUTION INTRAVENOUS at 02:11

## 2022-11-28 RX ADMIN — NYSTATIN 500000 UNITS: 100000 SUSPENSION ORAL at 05:11

## 2022-11-28 RX ADMIN — DEXTROSE AND SODIUM CHLORIDE: 5; .45 INJECTION, SOLUTION INTRAVENOUS at 08:11

## 2022-11-28 RX ADMIN — PIPERACILLIN SODIUM AND TAZOBACTAM SODIUM 4.5 G: 4; .5 INJECTION, POWDER, LYOPHILIZED, FOR SOLUTION INTRAVENOUS at 10:11

## 2022-11-28 RX ADMIN — SODIUM PHOSPHATE, MONOBASIC, MONOHYDRATE 30 MMOL: 276; 142 INJECTION, SOLUTION INTRAVENOUS at 06:11

## 2022-11-28 RX ADMIN — PIPERACILLIN SODIUM AND TAZOBACTAM SODIUM 4.5 G: 4; .5 INJECTION, POWDER, LYOPHILIZED, FOR SOLUTION INTRAVENOUS at 05:11

## 2022-11-28 RX ADMIN — ATORVASTATIN CALCIUM 40 MG: 40 TABLET, FILM COATED ORAL at 08:11

## 2022-11-28 RX ADMIN — SODIUM CHLORIDE: 0.9 INJECTION, SOLUTION INTRAVENOUS at 02:11

## 2022-11-28 NOTE — SUBJECTIVE & OBJECTIVE
Interval History: No acute events overnight.  Speech has normalized and she feels much better.  Noted oral thrush.  Remains on insulin drip.   at bedside.  Very tachycardic with just minimal activity.  Denies abdominal pain.  All questions answered and patient had no further complaints.    Review of Systems   Constitutional:  Positive for activity change and fatigue. Negative for fever.   HENT:  Negative for sore throat and trouble swallowing.    Eyes:  Negative for photophobia and visual disturbance.   Respiratory:  Negative for chest tightness and shortness of breath.    Cardiovascular:  Negative for chest pain, palpitations and leg swelling.   Gastrointestinal:  Positive for constipation. Negative for abdominal distention, abdominal pain, blood in stool, diarrhea, nausea and vomiting.   Endocrine: Negative for polydipsia and polyuria.   Genitourinary:  Positive for frequency. Negative for difficulty urinating, dysuria and hematuria.   Musculoskeletal:  Negative for neck pain and neck stiffness.   Skin:  Negative for pallor and rash.   Allergic/Immunologic: Negative for immunocompromised state.   Neurological:  Negative for dizziness, seizures, syncope, facial asymmetry and headaches.   Psychiatric/Behavioral:  Negative for agitation, behavioral problems, confusion and decreased concentration.    All other systems reviewed and are negative.  Objective:     Vital Signs (Most Recent):  Temp: 98.1 °F (36.7 °C) (11/28/22 1115)  Pulse: (!) 124 (11/28/22 1000)  Resp: 16 (11/28/22 1000)  BP: 104/74 (11/28/22 1115)  SpO2: 97 % (11/28/22 1000)   Vital Signs (24h Range):  Temp:  [97.4 °F (36.3 °C)-98.2 °F (36.8 °C)] 98.1 °F (36.7 °C)  Pulse:  [] 124  Resp:  [14-23] 16  SpO2:  [97 %-100 %] 97 %  BP: (103-123)/(58-81) 104/74     Weight: 71.2 kg (156 lb 15.5 oz)  Body mass index is 28.25 kg/m².    Intake/Output Summary (Last 24 hours) at 11/28/2022 1324  Last data filed at 11/28/2022 1200  Gross per 24 hour    Intake 2488.55 ml   Output 1400 ml   Net 1088.55 ml      Physical Exam  Vitals and nursing note reviewed.   Constitutional:       General: She is not in acute distress.     Appearance: She is ill-appearing. She is not toxic-appearing or diaphoretic.   HENT:      Head: Normocephalic and atraumatic.      Right Ear: External ear normal.      Left Ear: External ear normal.      Nose: Nose normal.      Mouth/Throat:      Mouth: Mucous membranes are moist.      Pharynx: No oropharyngeal exudate or posterior oropharyngeal erythema.   Eyes:      General: No scleral icterus.     Extraocular Movements: Extraocular movements intact.      Conjunctiva/sclera: Conjunctivae normal.   Neck:      Thyroid: No thyromegaly.   Cardiovascular:      Rate and Rhythm: Regular rhythm. Tachycardia present.      Heart sounds: No murmur heard.  Pulmonary:      Effort: Pulmonary effort is normal.      Breath sounds: Normal breath sounds. No stridor. No wheezing or rales.   Abdominal:      General: There is no distension.      Palpations: Abdomen is soft. There is no mass.      Tenderness: There is no abdominal tenderness. There is no guarding.      Comments: Incisions from recent surgery are c/d/i   Musculoskeletal:         General: Normal range of motion.      Cervical back: Normal range of motion. No rigidity.      Right lower leg: No edema.      Left lower leg: No edema.   Skin:     General: Skin is warm and dry.      Capillary Refill: Capillary refill takes less than 2 seconds.      Coloration: Skin is not jaundiced.      Findings: No bruising.   Neurological:      Mental Status: She is alert and oriented to person, place, and time.      Cranial Nerves: No cranial nerve deficit.      Motor: No weakness.      Comments: Diffusely weak   Psychiatric:         Mood and Affect: Mood normal.         Behavior: Behavior normal.       Significant Labs: All pertinent labs within the past 24 hours have been reviewed.    Significant Imaging: I have  reviewed all pertinent imaging results/findings within the past 24 hours.

## 2022-11-28 NOTE — PT/OT/SLP EVAL
Speech Language Pathology Evaluation  Bedside Swallow    Patient Name:  Aleyda Benitez   MRN:  [id number]  Admitting Diagnosis: Diabetic ketoacidosis without coma associated with type 2 diabetes mellitus    Recommendations:                 General Recommendations:  Follow-up not indicated  Diet recommendations:  Diet texture per Pt's request currently requesting Full liquids  Aspiration Precautions: 1 bite/sip at a time   General Precautions: Standard,    Communication strategies:  none    History:     Past Medical History:   Diagnosis Date    Diabetic ketoacidosis without coma associated with type 2 diabetes mellitus 11/27/2022    Endometrial adenocarcinoma 11/27/2022    Type 2 diabetes mellitus without complication, without long-term current use of insulin 11/27/2022       No past surgical history on file.    Social History: Patient lives with .    Chest X-Rays: 11/27 No acute intrathoracic abnormality.    Prior diet: regular with thin    Subjective   Pt reporting she has been on antibiotics since her surgery and her tongue burns. Per nursing dysarthria and confusion have resolved from yesterday. Speech was intelligible also like negatively impacted by lingual pain.   Patient goals: resolve of thrush     Pain/Comfort:  Pain Rating 1: 0/10  Pain Rating Post-Intervention 1: 0/10    Respiratory Status: Room air    Objective:     Oral Musculature Evaluation  Oral Musculature: WFL  Dentition: present and adequate  Secretion Management: adequate  Mucosal Quality: good  Lingual Strength and Mobility: WFL  Velar Elevation: WFL  Buccal Strength and Mobility: WFL  Voice Prior to PO Intake: wfl  Oral Musculature Comments: grossly symmetrical Pt reporign burning sensation on tongue    Bedside Swallow Eval:   Consistencies Assessed:  Thin liquids ~4oz via straw multiple trials  Puree X5+  Solids X1      Oral Phase:   WFL (the exception of lingual discomfort)     Pharyngeal Phase:   no overt clinical signs/symptoms of  aspiration    Compensatory Strategies  None    Treatment: please note silent aspiration cannot be r/o at bedside.     Assessment:     Aleyda Benitez is a 62 y.o. female with dx of Diabetic ketoacidosis without coma associated with type 2 diabetes mellitus she presents with functional swallow and articulation lingual thrush improved. Mental status resolved to baseline.     Goals:   Multidisciplinary Problems       SLP Goals       Not on file              Multidisciplinary Problems (Resolved)          Problem: SLP    Goal Priority Disciplines Outcome   SLP Goal   (Resolved)    Low SLP Met   Description: Pt will participate in SLP eval (goal met 11/28/22)                       Plan:     Plan of Care expires:  11/28/22  Plan of Care reviewed with:  patient   SLP Follow-Up:  No       Discharge recommendations:  other (see comments) (no further ST is warranted)   Barriers to Discharge:  None    Time Tracking:     SLP Treatment Date:   11/28/22  Speech Start Time:  1315  Speech Stop Time:  1325     Speech Total Time (min):  10 min    Billable Minutes: Eval Swallow and Oral Function 10    11/28/2022

## 2022-11-28 NOTE — PROGRESS NOTES
VANCOMYCIN DOSING BY PHARMACY DISCONTINUATION NOTE    Aleyda Benitez is a 62 y.o. female who had been consulted for vancomycin dosing.    The pharmacy consult for vancomycin dosing has been discontinued.     Vancomycin Dosing by Pharmacy Consult will sign-off. Please reconsult if necessary. Thank you for allowing us to participate in this patient's care.       Mesha Brooke, PharmD  [phone]

## 2022-11-28 NOTE — HOSPITAL COURSE
62 year old woman with uterine cancer s/p recent hysterectomy, recent diagnosis of diabetes mellitus, HTN and obesity presented for evaluation of fatigue and shortness of breath.  She was diagnosed with DKA, possible UTI and debility.  DKA being treated in standard fashion.  Has significant sinus tachycardia which could be due to her acidosis and dehydration but I'm concerned for possible PE given her malignancy and recent surgery.  D-dimer is elevated so checking CTA chest.  CTA negative for PE.  Had slurred speech on admit which has resolved.  Noted oral thrush so started nystatin.  Significant thrush and discomfort and added Fluconazole.  Has abnormal UA but urine cutlure only showing <50K yeast.  Stopped Zosyn.  PT/OT consulted and recommend HH at discharge. Patient with stable blood sugars. She is medically stable for DC. Discussed outpatient POC. Will refer to endocrinology and diabetic educator for DM type 2 education. She was initiated on scheduled meal time insulin and instructed to check glucose prior to each meal.

## 2022-11-28 NOTE — ASSESSMENT & PLAN NOTE
-Still with significant tachycardia  -Sinus on EKG and no chest pain  -Troponin negative  -Procalcitonin and lactic acid are normal.  WBC was hemoconcentrated and has normalized.  Will stop vancomycin - continue zosyn for questionable UTI.  -Could be secondary to her severe dehydration and acidosis, but given malignancy and very recent hysterectomy I am concerned about possible VTE.  No leg swelling or tenderness.  -Well's score for DVT 2 at most - moderate risk group  -Well's score for PE 4 - moderate risk group.  -Check D-dimer.  -Consider CTA chest if tachycardia not improving.  -Continue prophylactic lovenox.

## 2022-11-28 NOTE — PROGRESS NOTES
Vancomycin consult follow-up:    Patient reviewed, renal function stable, no new levels, continue current therapy; Next levels due: trough due 11/29/2022 at 1430

## 2022-11-28 NOTE — PROGRESS NOTES
Pharmacokinetic Initial Assessment: IV Vancomycin    Assessment/Plan:    Initiate intravenous vancomycin with loading dose of 2000   mg once followed by a maintenance dose of vancomycin 1000 mg IV every 24 hours  Desired empiric serum trough concentration is 10 to 20 mcg/mL  Draw vancomycin trough level 60 min prior to third dose on 11-29-22 at approximately 1430  Pharmacy will continue to follow and monitor vancomycin.      Please contact pharmacy at extension [phone] with any questions regarding this assessment.     Thank you for the consult,   Ame Guzman       Patient brief summary:  Aleyda Benitez is a 62 y.o. female initiated on antimicrobial therapy with IV Vancomycin for treatment of suspected  opportunistic infection prophylaxis    Drug Allergies:   Review of patient's allergies indicates:  No Known Allergies    Actual Body Weight:   74.8 kg    Renal Function:   Estimated Creatinine Clearance: 37.3 mL/min (A) (based on SCr of 1.5 mg/dL (H)).,     Dialysis Method (if applicable):  N/A    CBC (last 72 hours):  Recent Labs   Lab Result Units 11/27/22  1412   WBC K/uL 12.91*   Hemoglobin g/dL 17.3*   Hematocrit % 53.1*   Platelets K/uL 406   Gran % % 83.1*   Lymph % % 6.6*   Mono % % 5.4   Eosinophil % % 3.6   Basophil % % 0.4   Differential Method  Automated       Metabolic Panel (last 72 hours):  Recent Labs   Lab Result Units 11/27/22  1412 11/27/22  1442   Sodium mmol/L 142  --    Potassium mmol/L 3.3*  --    Chloride mmol/L 113*  --    CO2 mmol/L 7*  --    Glucose mg/dL 556*  --    Glucose, UA   --  4+*   BUN mg/dL 30*  --    Creatinine mg/dL 1.5*  --    Albumin g/dL 4.0  --    Total Bilirubin mg/dL 0.3  --    Alkaline Phosphatase U/L 79  --    AST U/L 9*  --    ALT U/L 22  --        Drug levels (last 3 results):  No results for input(s): VANCOMYCINRA, VANCORANDOM, VANCOMYCINPE, VANCOPEAK, VANCOMYCINTR, VANCOTROUGH in the last 72 hours.    Microbiologic Results:  Microbiology Results (last 7 days)        Procedure Component Value Units Date/Time    Urine culture [[id number]] Collected: 11/27/22 1442    Order Status: No result Specimen: Urine Updated: 11/27/22 1536    Blood culture x two cultures. Draw prior to antibiotics. [[id number]] Collected: 11/27/22 1413    Order Status: Sent Specimen: Blood from Peripheral, Forearm, Left Updated: 11/27/22 1442    Blood culture x two cultures. Draw prior to antibiotics. [[id number]] Collected: 11/27/22 1413    Order Status: Sent Specimen: Blood from Peripheral, Antecubital, Left Updated: 11/27/22 1442

## 2022-11-28 NOTE — PLAN OF CARE
Problem: Physical Therapy  Goal: Physical Therapy Goal  Description: Goals to be met by: 22     Patient will increase functional independence with mobility by performin. Supine to sit with Modified [place]  2. Sit to stand transfer with Modified [place]  3. Gait  x  feet with Modified [place] using Rolling Walker.   4. Lower extremity exercise program x10 reps per handout, with supervision    Outcome: Ongoing, Progressing   Initial PT evaluation performed today.  Pt could benefit from skilled PT services 5-6x/wk in order to maximize function prior to D/c.  HHPT, RW, BSC, TTB and Family support recommended at time of D/C.

## 2022-11-28 NOTE — PT/OT/SLP EVAL
Occupational Therapy   Evaluation    Name: Aleyda Benitez  MRN: [id number]  Admitting Diagnosis:  Diabetic ketoacidosis without coma associated with type 2 diabetes mellitus  Recent Surgery: * No surgery found *      Recommendations:     Discharge Recommendations: home health OT (with family support)  Discharge Equipment Recommendations:   (ongoing assessment pending further progress)  Barriers to discharge:   (elevated HR with minimal activity)    Assessment:     Aleyda Benitez is a 62 y.o. female with a medical diagnosis of Diabetic ketoacidosis without coma associated with type 2 diabetes mellitus. Performance deficits affecting function: weakness, impaired endurance, decreased ROM, decreased upper extremity function, impaired self care skills, impaired skin, decreased lower extremity function, impaired functional mobility, edema, impaired cardiopulmonary response to activity, impaired balance.      Session limited to stand pivot/few steps to BSC d/t elevated HR. HR to 158 bpm with static standing for OT to assist with pericare after urinating on BSC. Supportive  present. Nurse updated    Rehab Prognosis: Good; patient would benefit from acute skilled OT services to address these deficits and reach maximum level of function.       Plan:     Patient to be seen  (3-5x/week) to address the above listed problems via self-care/home management, therapeutic activities, therapeutic exercises  Plan of Care Expires: 12/12/22  Plan of Care Reviewed with: patient, spouse    Subjective     Chief Complaint: generalized weakness, needing to use the restroom (urinate)- hasn't had a BM since before hysterectomy   Patient/Family Comments/goals: wants a shower, wants to move     Occupational Profile:  Living Environment: pt lives with her  in a [facility] with 1 JOANA. Bathroom set-up: tub/shower combo  Previous level of function: independent   Roles and Routines: drives, works as a  for [employer] for ~30  years   Equipment Used at Home:  none  Assistance upon Discharge: ; son at [employer]     Pain/Comfort:  Pain Rating 1: 0/10    Patients cultural, spiritual, Islam conflicts given the current situation: no    Objective:     Communicated with: charge nurse prior to session.  Patient found HOB elevated with PureWick, peripheral IV, telemetry, blood pressure cuff, pulse ox (continuous) upon OT entry to room.    General Precautions: Standard, diabetic, fall (cardiac)   Orthopedic Precautions:N/A   Braces: N/A  Respiratory Status: Room air    HOB elevated pre-tx: 120s bpm, 111/66, 97%   HR seated EOB 120s-130s bpm  HR standing statically: HR to 158 bpm  Seated in chair post-tx: 133 bpm, 98%     Occupational Performance:    Bed Mobility:    Patient completed Log rolling with minimum assistance and with use of side rail   Patient completed Scooting with contact guard assistance  Patient completed Supine to Sit with minimum assistance, with side rail, and HOB elevated    Functional Mobility/Transfers:  Patient completed Sit <> Stand Transfer with CGA-MIN A  with  rolling walker   Patient completed Toilet Transfer step transfer technique with minimum assistance with  rolling walker  Functional Mobility: pt completed few small steps bed> BSC 2* urge to urinate with CGA-MIN A using RW with verbal cueing for body mechanics within RW. After toileting then seated rest break, pt completed couple small steps BSC>chair using RW with CGA.     Activities of Daily Living:  Grooming: total A provided while pt resting in-between ADLs to comb her hair    Upper Body Dressing: minimum assistance to change from soiled gown to clean gown 2* multiple lines/leads while seated unsupported EOB   Toileting: pt completed anterior pericare seated on BSC with supervision; d/t elevated HR, pt stood statically within RW with CGA while OT provided total A for posterior pericare and cleaning of B/L thighs 2* found soiled in the bed      Cognitive/Visual Perceptual:  Cognitive/Psychosocial Skills:     -       Follows Commands/attention:Follows multistep  commands  -       Communication: slurring of words noted   -       Memory: No Deficits noted  -       Safety awareness/insight to disability: intact   -       Mood/Affect/Coping skills/emotional control: Cooperative and Pleasant  Visual/Perceptual:      -Intact  R/L discrimination      Physical Exam:  Balance:    -       seated: SBA; standing: CGA-MIN A with RW  Postural examination/scapula alignment:    -       Rounded shoulders  Skin integrity: Visible skin intact and incisions to abdomen covered with small dressings  Edema:  Mild BUE  Sensation:    -       Intact  light/touch BUE  Dominant hand:    -       Right  Upper Extremity Range of Motion:     -       Right Upper Extremity: WFL  -       Left Upper Extremity: WFL  Upper Extremity Strength:    -       R/L Upper Extremity: globally ~4/5   Strength:    -       Right Upper Extremity: WFL  -       Left Upper Extremity: WFL  Fine Motor Coordination:    -       Intact  Left hand, manipulation of objects and Right hand, manipulation of objects  Gross motor coordination:   WFL    AMPAC 6 Click ADL:  AMPAC Total Score: 19    Treatment & Education:  Pt educated on OT role/POC.   Importance of OOB activity with staff assistance  Safety during functional t/f and mobility   Multiple self-care tasks/functional mobility completed- assistance level noted above   All questions/concerns answered within OT scope of practice       Patient left up in chair reclined with pillow offloading B/L heels with all lines intact, call button in reach, nursing notified, and all needs met/within reach; tray table in front of pt with all needs met/within reach; nurse notified pt found soiled and purewick not working.     GOALS:   Multidisciplinary Problems       Occupational Therapy Goals          Problem: Occupational Therapy    Goal Priority Disciplines Outcome  Interventions   Occupational Therapy Goal     OT, PT/OT Ongoing, Progressing    Description: Goals to be met by: 12/12/22     Patient will increase functional independence with ADLs by performing:    UE Dressing with Modified [place].  LE Dressing with Modified [place].  Grooming while standing at sink with Modified [place].  Toileting from toilet with Modified [place] for hygiene and clothing management.   Supine to sit with Modified [place].  Step transfer with Modified [place]  Toilet transfer to toilet with Modified [place].  Upper extremity exercise program x15 reps per handout, with independence.                         History:     Past Medical History:   Diagnosis Date    Diabetic ketoacidosis without coma associated with type 2 diabetes mellitus 11/27/2022    Endometrial adenocarcinoma 11/27/2022    Type 2 diabetes mellitus without complication, without long-term current use of insulin 11/27/2022       No past surgical history on file.    Time Tracking:     OT Date of Treatment: 11/28/22  OT Start Time: 1019  OT Stop Time: 1049  OT Total Time (min): 30 min    Billable Minutes:Evaluation 15 min  Self Care/Home Management 15 min  Total Time 30 min (co-eval with PT)    11/28/2022

## 2022-11-28 NOTE — NURSING
[facility]  ICU Shift Summary  Date: 11/28/2022      Prehospitalization: Home  Admit Date / LOS : 11/27/2022/ 1 days    Diagnosis: Diabetic ketoacidosis without coma associated with type 2 diabetes mellitus    Consults:        Active: N/A       Needed: N/A     Code Status: No Order   Advanced Directive: <no information>    LDA:  Lines/Drains/Airways       Peripheral Intravenous Line  Duration                  Peripheral IV - Single Lumen 11/27/22 1310 22 G Left Hand <1 day         Peripheral IV - Single Lumen 11/27/22 1350 20 G Right Antecubital <1 day         Peripheral IV - Single Lumen 11/27/22 1400 20 G Right Forearm <1 day                  Central Lines/Site/Justification:Patient Does Not Have Central Line  Urinary Cath/Order/Justification:Patient Does Not Have Urinary Catheter    Vasopressors/Infusions:    sodium chloride 0.9% Stopped (11/27/22 2102)    dextrose 5 % and 0.45 % NaCl 125 mL/hr at 11/28/22 0553    insulin regular 1 units/mL infusion orderable (DKA) 2.35 Units/hr (11/28/22 0608)          GOALS: Volume/ Hemodynamic: N/A                     RASS: 0  alert and calm    Pain Management: none       Pain Controlled: yes     Rhythm: NSR    Respiratory Device: Room Air                  Most Recent SBT/ SAT: Did not perform       MOVE Screen: PASS  ICU Liberation: no    VTE Prophylaxis: Pharm  Mobility: Ambulatory  Stress Ulcer Prophylaxis: Yes    Isolation: No active isolations    Dietary:   Current Diet Order   No orders of the defined types were placed in this encounter.      Tolerance: not applicable  Advancement: no    I & O (24h):    Intake/Output Summary (Last 24 hours) at 11/28/2022 0617  Last data filed at 11/28/2022 0613  Gross per 24 hour   Intake 1995.72 ml   Output 1000 ml   Net 995.72 ml        Restraints: No    Significant Dates:  Post Op Date: N/A  Rescue Date: N/A  Imaging/ Diagnostics: N/A    Noteworthy Labs:  Co2 13    COVID Test: (--)  CBC/Anemia Labs: Coags:    Recent  Labs   Lab 11/27/22  1412 11/28/22  0417   WBC 12.91* 11.42   HGB 17.3* 14.0   HCT 53.1* 41.7    326   MCV 94 89   RDW 13.6 13.3    Recent Labs   Lab 11/27/22  1412   INR 1.0        Chemistries:   Recent Labs   Lab 11/27/22  1412 11/27/22  1951 11/28/22  0005 11/28/22  0417      < > 144 142   K 3.3*   < > 4.2 4.2   *   < > 118* 117*   CO2 7*   < > 10* 13*   BUN 30*   < > 23* 19   CREATININE 1.5*   < > 0.8 0.9   CALCIUM 11.4*   < > 10.0 10.0   PROT 8.3  --   --   --    BILITOT 0.3  --   --   --    ALKPHOS 79  --   --   --    ALT 22  --   --   --    AST 9*  --   --   --    MG  --    < > 2.4 2.3   PHOS  --    < > 1.2* <1.0*    < > = values in this interval not displayed.        Cardiac Enzymes: Ejection Fractions:    Recent Labs     11/27/22  1412   TROPONINI 0.009    No results found for: EF     POCT Glucose: HbA1c:    Recent Labs   Lab 11/28/22  0403 11/28/22  0507 11/28/22  0605   POCTGLUCOSE 244* 249* 259*    Hemoglobin A1C   Date Value Ref Range Status   11/16/2022 11.9 (H) 4.0 - 6.0 % Final           ICU LOS 11h  Level of Care: Critical Care    Chart Check: 24 HR Done  Shift Summary/Plan for the shift: Insulin drip @ 2.35 u/hr. D5 1/2 @ 125 cc hr. Phos replacement infusing.

## 2022-11-28 NOTE — ASSESSMENT & PLAN NOTE
-UA with 1+LE, negative nitrite and 15 wbc  -Suspect due to DKA and dehydration  -Urine culture negative thus far.  -Will continue zosyn pending culture results - doubt UTI.

## 2022-11-28 NOTE — PLAN OF CARE
ST RECS: Diet per Pt preference; currently requesting full liquids secondary to pain from lingual thrush can upgrade to regular when she feels more comfortable. Consider nystatin swish. No further ST is warranted.

## 2022-11-28 NOTE — PLAN OF CARE
Problem: Occupational Therapy  Goal: Occupational Therapy Goal  Description: Goals to be met by: 12/12/22     Patient will increase functional independence with ADLs by performing:    UE Dressing with Modified [place].  LE Dressing with Modified [place].  Grooming while standing at sink with Modified [place].  Toileting from toilet with Modified [place] for hygiene and clothing management.   Supine to sit with Modified [place].  Step transfer with Modified [place]  Toilet transfer to toilet with Modified [place].  Upper extremity exercise program x15 reps per handout, with independence.    Outcome: Ongoing, Progressing     Session limited to stand pivot/few steps to BSC d/t elevated HR. HR to 158 bpm with static standing for OT to assist with pericare after urinating on BSC. Supportive  present. Nurse updated.

## 2022-11-28 NOTE — NURSING
AMAYA Garner MD notified for tongue thrush. Per MD, will place order for oral rinse.     1331: AMAYA Garner MD notified for phosphorus lab 1.8. Per MD, will place order for supplement.

## 2022-11-28 NOTE — ASSESSMENT & PLAN NOTE
-S/p recent hysterectomy  -Incisions look good and no abdominal pain.  -Still awaiting to see if LN had cancer/mets, f/u path outpt

## 2022-11-28 NOTE — PROGRESS NOTES
[facility] Medicine  Progress Note    Patient Name: Aleyda Benitez  MRN: [id number]  Patient Class: IP- Inpatient   Admission Date: 11/27/2022  Length of Stay: 1 days  Attending Physician: Cb Garner MD  Primary Care Provider: Primary Doctor No        Subjective:     Principal Problem:Diabetic ketoacidosis without coma associated with type 2 diabetes mellitus        HPI:    Aleyda Benitez is a 62 y.o. female who DM2, HTN, Obesity, OP, and Endometrial Adenocarcinoma has no past medical history on file, presented to the ED with Generalized Fatigue and High Home Glucoses.  Patient was just diagnosed with endometrial carcinoma a couple weeks ago, she had her uterus removed 6 days ago.  Does not sound like she is had a PET scan yet, lymph node biopsy still pending pathology at this point.  Patient was unaware she was diabetic before a couple weeks ago, when doing workup for the surgery.  She was starting on two oral diabetic medications.  Patient was unable to get sugar down at her house, and was brought into the emergency room after she was progressively worsening, including generalized weakness, hypotension, slurred speech.   recognized the slurring of speech last night, but got progressively worse this morning.  She has not had a bowel movement since her procedure 6 days ago.  She has polyuria and polydipsia.  Patient denies any abdominal discomfort associated with the procedure, denies protrusion.  She denies chest pain or shortness of breath.    In the ED, patient was found to be in DKA, with a pH of 7.075, bicarb 7, anion gap 22 and beta hydroxybutyrate 5.3.  She is severely dehydrated, with concentrated CBC.  Glucose 556.  Patient given fluids started insulin drip and placed in ICU.    Home meds:  Losartan 50 mg once daily  Metoprolol succ 50 mg once daily  Metformin 500 mg q24 once daily  Empagliflozin 10 mg once  Glargine 15U nightly (?)  On Care  everywhere  Medroxyprogesterone 10 mg       Overview/Hospital Course:  No notes on file    Interval History: No acute events overnight.  Speech has normalized and she feels much better.  Noted oral thrush.  Remains on insulin drip.   at bedside.  Very tachycardic with just minimal activity.  Denies abdominal pain.  All questions answered and patient had no further complaints.    Review of Systems   Constitutional:  Positive for activity change and fatigue. Negative for fever.   HENT:  Negative for sore throat and trouble swallowing.    Eyes:  Negative for photophobia and visual disturbance.   Respiratory:  Negative for chest tightness and shortness of breath.    Cardiovascular:  Negative for chest pain, palpitations and leg swelling.   Gastrointestinal:  Positive for constipation. Negative for abdominal distention, abdominal pain, blood in stool, diarrhea, nausea and vomiting.   Endocrine: Negative for polydipsia and polyuria.   Genitourinary:  Positive for frequency. Negative for difficulty urinating, dysuria and hematuria.   Musculoskeletal:  Negative for neck pain and neck stiffness.   Skin:  Negative for pallor and rash.   Allergic/Immunologic: Negative for immunocompromised state.   Neurological:  Negative for dizziness, seizures, syncope, facial asymmetry and headaches.   Psychiatric/Behavioral:  Negative for agitation, behavioral problems, confusion and decreased concentration.    All other systems reviewed and are negative.  Objective:     Vital Signs (Most Recent):  Temp: 98.1 °F (36.7 °C) (11/28/22 1115)  Pulse: (!) 124 (11/28/22 1000)  Resp: 16 (11/28/22 1000)  BP: 104/74 (11/28/22 1115)  SpO2: 97 % (11/28/22 1000)   Vital Signs (24h Range):  Temp:  [97.4 °F (36.3 °C)-98.2 °F (36.8 °C)] 98.1 °F (36.7 °C)  Pulse:  [] 124  Resp:  [14-23] 16  SpO2:  [97 %-100 %] 97 %  BP: (103-123)/(58-81) 104/74     Weight: 71.2 kg (156 lb 15.5 oz)  Body mass index is 28.25 kg/m².    Intake/Output Summary (Last  24 hours) at 11/28/2022 1324  Last data filed at 11/28/2022 1200  Gross per 24 hour   Intake 2488.55 ml   Output 1400 ml   Net 1088.55 ml      Physical Exam  Vitals and nursing note reviewed.   Constitutional:       General: She is not in acute distress.     Appearance: She is ill-appearing. She is not toxic-appearing or diaphoretic.   HENT:      Head: Normocephalic and atraumatic.      Right Ear: External ear normal.      Left Ear: External ear normal.      Nose: Nose normal.      Mouth/Throat:      Mouth: Mucous membranes are moist.      Pharynx: No oropharyngeal exudate or posterior oropharyngeal erythema.   Eyes:      General: No scleral icterus.     Extraocular Movements: Extraocular movements intact.      Conjunctiva/sclera: Conjunctivae normal.   Neck:      Thyroid: No thyromegaly.   Cardiovascular:      Rate and Rhythm: Regular rhythm. Tachycardia present.      Heart sounds: No murmur heard.  Pulmonary:      Effort: Pulmonary effort is normal.      Breath sounds: Normal breath sounds. No stridor. No wheezing or rales.   Abdominal:      General: There is no distension.      Palpations: Abdomen is soft. There is no mass.      Tenderness: There is no abdominal tenderness. There is no guarding.      Comments: Incisions from recent surgery are c/d/i   Musculoskeletal:         General: Normal range of motion.      Cervical back: Normal range of motion. No rigidity.      Right lower leg: No edema.      Left lower leg: No edema.   Skin:     General: Skin is warm and dry.      Capillary Refill: Capillary refill takes less than 2 seconds.      Coloration: Skin is not jaundiced.      Findings: No bruising.   Neurological:      Mental Status: She is alert and oriented to person, place, and time.      Cranial Nerves: No cranial nerve deficit.      Motor: No weakness.      Comments: Diffusely weak   Psychiatric:         Mood and Affect: Mood normal.         Behavior: Behavior normal.       Significant Labs: All pertinent  labs within the past 24 hours have been reviewed.    Significant Imaging: I have reviewed all pertinent imaging results/findings within the past 24 hours.      Assessment/Plan:      * Diabetic ketoacidosis without coma associated with type 2 diabetes mellitus  -Admitted to inpatient status  -Noted recent diagnosis of DM by PCP and was hoping to be able to control with diet, metformin and jardiance.  -On admit glucose 556, anion gap 22, beta-hydroxybutyrate 5.3, pH 7.0 and bicarb 7.  -A1c 11.9  -She was monitored with accu check q1h and bmp q4h  -She has been treated with IV fluids and insulin drip.  -Anion gap now down to 16, but CO2 still only 10 and she is significantly tachycardic.  -Continue care in ICU insulin drip until acidosis improves further.  Check VBG, d-dimer and lactic acid.  -Will require insulin at discharge.    Slurred speech  -Noted slurred speech, lethargy and generalized weakness on admit.  Still weak but no-longer lethargic and speech has almost completely normalized (I cannot appreciate slurring now)  -No focal deficits on exam and noted oral thrush.  -Suspect due to DKA, severe dehydration and oral thrush  -Start nystatin swish/swallow for thrush  -Consult pt/ot when more stable - still too tachycardic.    Tachycardia  -Still with significant tachycardia  -Sinus on EKG and no chest pain  -Troponin negative  -Procalcitonin and lactic acid are normal.  WBC was hemoconcentrated and has normalized.  Will stop vancomycin - continue zosyn for questionable UTI.  -Could be secondary to her severe dehydration and acidosis, but given malignancy and very recent hysterectomy I am concerned about possible VTE.  No leg swelling or tenderness.  -Well's score for DVT 2 at most - moderate risk group  -Well's score for PE 4 - moderate risk group.  -Check D-dimer.  -Consider CTA chest if tachycardia not improving.  -Continue prophylactic lovenox.    Debility  -Consult pt/ot when clinically more  stable.    Abnormal urinalysis  -UA with 1+LE, negative nitrite and 15 wbc  -Suspect due to DKA and dehydration  -Urine culture negative thus far.  -Will continue zosyn pending culture results - doubt UTI.    Endometrial adenocarcinoma  -S/p recent hysterectomy  -Incisions look good and no abdominal pain.  -Still awaiting to see if LN had cancer/mets, f/u path outpt    Essential hypertension  -BP solidly normal but rather tachycardic  -Holding home losartan    Type 2 diabetes mellitus without complication, without long-term current use of insulin  -Treatment as above for DKA      VTE Risk Mitigation (From admission, onward)         Ordered     enoxaparin injection 40 mg  Daily         11/27/22 1637     IP VTE LOW RISK PATIENT  Once         11/27/22 1439                Discharge Planning   COLE:      Code Status: Not on file   Is the patient medically ready for discharge?:     Reason for patient still in hospital (select all that apply): Treatment               Critical care time spent on the evaluation and treatment of severe organ dysfunction, review of pertinent labs and imaging studies, discussions with consulting providers and discussions with patient/family: 45 minutes.      Cb Garner MD  Department of Hospital Medicine   [facility] [place] [place] [facility]

## 2022-11-28 NOTE — PLAN OF CARE
[facility] - Intensive Care  Initial Discharge Assessment       Primary Care Provider: Primary Doctor No    Admission Diagnosis: Status post hysterectomy [Z90.710]  Hypotension [I95.9]  Diabetic ketoacidosis without coma associated with type 2 diabetes mellitus [E11.10]    Admission Date: 11/27/2022  Expected Discharge Date:     SW completed initial assessment and discussed discharge planning with patient and her  Wesley at her bedside. Patient stated that she lives with her  and he is going to help her while recover at home. Patient is requesting a shower chair and rolling walker to go home with when discharge from the hospital. Patient's  will provide transportation for her to get home when discharge from the hospital.    Discharge Barriers Identified: Nursing Home rejection, None    Payor: [facility] RESOURCES / Plan: [facility] HEALTH / Product Type: Commercial /     Extended Emergency Contact Information  Primary Emergency Contact: Eli Pride  Address: [address] [place] [address] [facility]  Home Phone: [phone]  Relation: Spouse    Discharge Plan A: Home with family  Discharge Plan B: Home Health      [facility] at [place], [place] - [address] AT [facility] [address]  [address]  [address]  [facility] [address]  Phone: [phone] Fax: [phone]      Initial Assessment (most recent)       Adult Discharge Assessment - 11/28/22 1647          Discharge Assessment    Assessment Type Discharge Planning Assessment     Confirmed/corrected address, phone number and insurance Yes     Confirmed Demographics Correct on Facesheet     Source of Information patient     When was your last doctors appointment? --   Patient visited her PCP 2 weeks ago    Reason For Admission Diabetes ketoacidosis     Lives With spouse     Do you expect to return to your current living situation? Yes     Do you have help at home or someone to help  you manage your care at home? Yes     Who are your caregiver(s) and their phone number(s)? Eli Pride (Spouse)   [phone] (Home Phone)     Prior to hospitilization cognitive status: Alert/Oriented     Current cognitive status: Alert/Oriented     Walking or Climbing Stairs Difficulty none     Dressing/Bathing Difficulty none     Equipment Currently Used at Home none     Readmission within 30 days? No     Patient currently being followed by outpatient case management? No     Do you currently have service(s) that help you manage your care at home? No     Do you take prescription medications? Yes     Do you have prescription coverage? Yes     Who is going to help you get home at discharge? Levine, Susan. \Hospital Has a New Name and Outlook.\""SGB     How do you get to doctors appointments? car, drives self;family or friend will provide     Are you on dialysis? No     Do you take coumadin? No     Discharge Plan A Home with family     Discharge Plan B Home Health     DME Needed Upon Discharge  shower chair;walker, rolling     Discharge Plan discussed with: Patient     Discharge Barriers Identified Nursing Home rejection;None        Relationship/Environment    Name(s) of Who Lives With Patient Eli Pride (Spouse)   [phone] (Home Phone)

## 2022-11-28 NOTE — NURSING TRANSFER
Nursing Transfer Note      11/27/2022     Reason patient is being transferred: Insulin gtt    Transfer From: ER TO     Transfer via bed    Transfer with cardiac monitoring, IV medications    Transported by RN     Medicines sent: Insulin and NS gtt    Any special needs or follow-up needed: Assess glucose    Chart send with patient: Yes    Notified: Family already at hospital    Patient reassessed at: 27 November 2022, 1835    Upon arrival to floor: cardiac monitor applied, patient oriented to room, call bell in reach, and bed in lowest position

## 2022-11-29 LAB
ANION GAP SERPL CALC-SCNC: 10 MMOL/L (ref 8–16)
ANION GAP SERPL CALC-SCNC: 9 MMOL/L (ref 8–16)
ANION GAP SERPL CALC-SCNC: 9 MMOL/L (ref 8–16)
BASOPHILS # BLD AUTO: 0.01 K/UL (ref 0–0.2)
BASOPHILS NFR BLD: 0.1 % (ref 0–1.9)
BUN SERPL-MCNC: 12 MG/DL (ref 6–20)
BUN SERPL-MCNC: 13 MG/DL (ref 6–20)
BUN SERPL-MCNC: 14 MG/DL (ref 6–20)
CALCIUM SERPL-MCNC: 9.3 MG/DL (ref 8.7–10.5)
CALCIUM SERPL-MCNC: 9.5 MG/DL (ref 8.7–10.5)
CALCIUM SERPL-MCNC: 9.6 MG/DL (ref 8.7–10.5)
CHLORIDE SERPL-SCNC: 113 MMOL/L (ref 95–110)
CHLORIDE SERPL-SCNC: 114 MMOL/L (ref 95–110)
CHLORIDE SERPL-SCNC: 115 MMOL/L (ref 95–110)
CO2 SERPL-SCNC: 21 MMOL/L (ref 23–29)
CREAT SERPL-MCNC: 0.5 MG/DL (ref 0.5–1.4)
CREAT SERPL-MCNC: 0.6 MG/DL (ref 0.5–1.4)
CREAT SERPL-MCNC: 0.7 MG/DL (ref 0.5–1.4)
DIFFERENTIAL METHOD: ABNORMAL
EOSINOPHIL # BLD AUTO: 0 K/UL (ref 0–0.5)
EOSINOPHIL NFR BLD: 0.1 % (ref 0–8)
ERYTHROCYTE [DISTWIDTH] IN BLOOD BY AUTOMATED COUNT: 13.4 % (ref 11.5–14.5)
EST. GFR  (NO RACE VARIABLE): >60 ML/MIN/1.73 M^2
GLUCOSE SERPL-MCNC: 219 MG/DL (ref 70–110)
GLUCOSE SERPL-MCNC: 220 MG/DL (ref 70–110)
GLUCOSE SERPL-MCNC: 258 MG/DL (ref 70–110)
HCT VFR BLD AUTO: 34.5 % (ref 37–48.5)
HGB BLD-MCNC: 12.3 G/DL (ref 12–16)
IMM GRANULOCYTES # BLD AUTO: 0.06 K/UL (ref 0–0.04)
IMM GRANULOCYTES NFR BLD AUTO: 0.7 % (ref 0–0.5)
LYMPHOCYTES # BLD AUTO: 1.8 K/UL (ref 1–4.8)
LYMPHOCYTES NFR BLD: 19.3 % (ref 18–48)
MAGNESIUM SERPL-MCNC: 2 MG/DL (ref 1.6–2.6)
MAGNESIUM SERPL-MCNC: 2 MG/DL (ref 1.6–2.6)
MAGNESIUM SERPL-MCNC: 2.1 MG/DL (ref 1.6–2.6)
MCH RBC QN AUTO: 31.1 PG (ref 27–31)
MCHC RBC AUTO-ENTMCNC: 35.7 G/DL (ref 32–36)
MCV RBC AUTO: 87 FL (ref 82–98)
MONOCYTES # BLD AUTO: 0.6 K/UL (ref 0.3–1)
MONOCYTES NFR BLD: 6.6 % (ref 4–15)
NEUTROPHILS # BLD AUTO: 6.7 K/UL (ref 1.8–7.7)
NEUTROPHILS NFR BLD: 73.2 % (ref 38–73)
NRBC BLD-RTO: 0 /100 WBC
PHOSPHATE SERPL-MCNC: 1 MG/DL (ref 2.7–4.5)
PHOSPHATE SERPL-MCNC: 2 MG/DL (ref 2.7–4.5)
PHOSPHATE SERPL-MCNC: 2.8 MG/DL (ref 2.7–4.5)
PLATELET # BLD AUTO: 247 K/UL (ref 150–450)
PMV BLD AUTO: 8.9 FL (ref 9.2–12.9)
POCT GLUCOSE: 162 MG/DL (ref 70–110)
POCT GLUCOSE: 201 MG/DL (ref 70–110)
POCT GLUCOSE: 205 MG/DL (ref 70–110)
POCT GLUCOSE: 207 MG/DL (ref 70–110)
POCT GLUCOSE: 207 MG/DL (ref 70–110)
POCT GLUCOSE: 208 MG/DL (ref 70–110)
POCT GLUCOSE: 216 MG/DL (ref 70–110)
POCT GLUCOSE: 218 MG/DL (ref 70–110)
POCT GLUCOSE: 234 MG/DL (ref 70–110)
POCT GLUCOSE: 234 MG/DL (ref 70–110)
POCT GLUCOSE: 235 MG/DL (ref 70–110)
POCT GLUCOSE: 257 MG/DL (ref 70–110)
POCT GLUCOSE: 265 MG/DL (ref 70–110)
POTASSIUM SERPL-SCNC: 3.2 MMOL/L (ref 3.5–5.1)
POTASSIUM SERPL-SCNC: 3.3 MMOL/L (ref 3.5–5.1)
POTASSIUM SERPL-SCNC: 3.4 MMOL/L (ref 3.5–5.1)
RBC # BLD AUTO: 3.96 M/UL (ref 4–5.4)
SODIUM SERPL-SCNC: 144 MMOL/L (ref 136–145)
SODIUM SERPL-SCNC: 144 MMOL/L (ref 136–145)
SODIUM SERPL-SCNC: 145 MMOL/L (ref 136–145)
WBC # BLD AUTO: 9.08 K/UL (ref 3.9–12.7)

## 2022-11-29 PROCEDURE — 25000003 PHARM REV CODE 250: Performed by: EMERGENCY MEDICINE

## 2022-11-29 PROCEDURE — 25000003 PHARM REV CODE 250: Performed by: HOSPITALIST

## 2022-11-29 PROCEDURE — 84100 ASSAY OF PHOSPHORUS: CPT | Mod: 91 | Performed by: STUDENT IN AN ORGANIZED HEALTH CARE EDUCATION/TRAINING PROGRAM

## 2022-11-29 PROCEDURE — 36415 COLL VENOUS BLD VENIPUNCTURE: CPT | Performed by: HOSPITALIST

## 2022-11-29 PROCEDURE — 63600175 PHARM REV CODE 636 W HCPCS: Performed by: HOSPITALIST

## 2022-11-29 PROCEDURE — 80048 BASIC METABOLIC PNL TOTAL CA: CPT | Mod: 91 | Performed by: STUDENT IN AN ORGANIZED HEALTH CARE EDUCATION/TRAINING PROGRAM

## 2022-11-29 PROCEDURE — 83735 ASSAY OF MAGNESIUM: CPT | Mod: 91 | Performed by: STUDENT IN AN ORGANIZED HEALTH CARE EDUCATION/TRAINING PROGRAM

## 2022-11-29 PROCEDURE — 97535 SELF CARE MNGMENT TRAINING: CPT

## 2022-11-29 PROCEDURE — 25000003 PHARM REV CODE 250: Performed by: SURGERY

## 2022-11-29 PROCEDURE — 11000001 HC ACUTE MED/SURG PRIVATE ROOM

## 2022-11-29 PROCEDURE — 80048 BASIC METABOLIC PNL TOTAL CA: CPT | Mod: 91 | Performed by: HOSPITALIST

## 2022-11-29 PROCEDURE — 83735 ASSAY OF MAGNESIUM: CPT | Mod: 91 | Performed by: HOSPITALIST

## 2022-11-29 PROCEDURE — 36415 COLL VENOUS BLD VENIPUNCTURE: CPT | Performed by: STUDENT IN AN ORGANIZED HEALTH CARE EDUCATION/TRAINING PROGRAM

## 2022-11-29 PROCEDURE — 63600175 PHARM REV CODE 636 W HCPCS: Performed by: EMERGENCY MEDICINE

## 2022-11-29 PROCEDURE — 25000003 PHARM REV CODE 250: Performed by: STUDENT IN AN ORGANIZED HEALTH CARE EDUCATION/TRAINING PROGRAM

## 2022-11-29 PROCEDURE — 85025 COMPLETE CBC W/AUTO DIFF WBC: CPT | Performed by: STUDENT IN AN ORGANIZED HEALTH CARE EDUCATION/TRAINING PROGRAM

## 2022-11-29 PROCEDURE — 97530 THERAPEUTIC ACTIVITIES: CPT

## 2022-11-29 PROCEDURE — 63700000 PHARM REV CODE 250 ALT 637 W/O HCPCS: Performed by: HOSPITALIST

## 2022-11-29 PROCEDURE — 84100 ASSAY OF PHOSPHORUS: CPT | Mod: 91 | Performed by: HOSPITALIST

## 2022-11-29 PROCEDURE — 84100 ASSAY OF PHOSPHORUS: CPT | Performed by: STUDENT IN AN ORGANIZED HEALTH CARE EDUCATION/TRAINING PROGRAM

## 2022-11-29 RX ORDER — METOPROLOL SUCCINATE 25 MG/1
25 TABLET, EXTENDED RELEASE ORAL DAILY
Status: DISCONTINUED | OUTPATIENT
Start: 2022-11-29 | End: 2022-12-01 | Stop reason: HOSPADM

## 2022-11-29 RX ORDER — PANTOPRAZOLE SODIUM 40 MG/1
40 TABLET, DELAYED RELEASE ORAL DAILY
Status: DISCONTINUED | OUTPATIENT
Start: 2022-11-29 | End: 2022-12-01 | Stop reason: HOSPADM

## 2022-11-29 RX ORDER — INSULIN ASPART 100 [IU]/ML
5 INJECTION, SOLUTION INTRAVENOUS; SUBCUTANEOUS
Status: DISCONTINUED | OUTPATIENT
Start: 2022-11-29 | End: 2022-12-01 | Stop reason: HOSPADM

## 2022-11-29 RX ORDER — TRAMADOL HYDROCHLORIDE 50 MG/1
50 TABLET ORAL EVERY 6 HOURS PRN
Status: DISCONTINUED | OUTPATIENT
Start: 2022-11-29 | End: 2022-12-01 | Stop reason: HOSPADM

## 2022-11-29 RX ORDER — LIDOCAINE HYDROCHLORIDE 20 MG/ML
5 SOLUTION OROPHARYNGEAL EVERY 4 HOURS
Status: DISCONTINUED | OUTPATIENT
Start: 2022-11-29 | End: 2022-12-01 | Stop reason: HOSPADM

## 2022-11-29 RX ORDER — IBUPROFEN 200 MG
24 TABLET ORAL
Status: DISCONTINUED | OUTPATIENT
Start: 2022-11-29 | End: 2022-12-01 | Stop reason: HOSPADM

## 2022-11-29 RX ORDER — METOPROLOL TARTRATE 25 MG/1
12.5 TABLET ORAL 2 TIMES DAILY
Status: DISCONTINUED | OUTPATIENT
Start: 2022-11-29 | End: 2022-11-29

## 2022-11-29 RX ORDER — GLUCAGON 1 MG
1 KIT INJECTION
Status: DISCONTINUED | OUTPATIENT
Start: 2022-11-29 | End: 2022-12-01 | Stop reason: HOSPADM

## 2022-11-29 RX ORDER — ACETAMINOPHEN 325 MG/1
650 TABLET ORAL EVERY 4 HOURS PRN
Status: DISCONTINUED | OUTPATIENT
Start: 2022-11-29 | End: 2022-12-01 | Stop reason: HOSPADM

## 2022-11-29 RX ORDER — INSULIN ASPART 100 [IU]/ML
1-10 INJECTION, SOLUTION INTRAVENOUS; SUBCUTANEOUS
Status: DISCONTINUED | OUTPATIENT
Start: 2022-11-29 | End: 2022-12-01 | Stop reason: HOSPADM

## 2022-11-29 RX ORDER — FLUCONAZOLE 100 MG/1
200 TABLET ORAL ONCE
Status: COMPLETED | OUTPATIENT
Start: 2022-11-29 | End: 2022-11-29

## 2022-11-29 RX ORDER — IBUPROFEN 200 MG
16 TABLET ORAL
Status: DISCONTINUED | OUTPATIENT
Start: 2022-11-29 | End: 2022-12-01 | Stop reason: HOSPADM

## 2022-11-29 RX ORDER — FLUCONAZOLE 100 MG/1
100 TABLET ORAL DAILY
Status: DISCONTINUED | OUTPATIENT
Start: 2022-11-30 | End: 2022-12-01 | Stop reason: HOSPADM

## 2022-11-29 RX ADMIN — INSULIN ASPART 1 UNITS: 100 INJECTION, SOLUTION INTRAVENOUS; SUBCUTANEOUS at 09:11

## 2022-11-29 RX ADMIN — INSULIN DETEMIR 10 UNITS: 100 INJECTION, SOLUTION SUBCUTANEOUS at 09:11

## 2022-11-29 RX ADMIN — POTASSIUM PHOSPHATE, MONOBASIC AND POTASSIUM PHOSPHATE, DIBASIC 30 MMOL: 224; 236 INJECTION, SOLUTION, CONCENTRATE INTRAVENOUS at 01:11

## 2022-11-29 RX ADMIN — FLUCONAZOLE 200 MG: 100 TABLET ORAL at 10:11

## 2022-11-29 RX ADMIN — ASPIRIN 81 MG: 81 TABLET, DELAYED RELEASE ORAL at 08:11

## 2022-11-29 RX ADMIN — PANTOPRAZOLE SODIUM 40 MG: 40 TABLET, DELAYED RELEASE ORAL at 01:11

## 2022-11-29 RX ADMIN — INSULIN ASPART 5 UNITS: 100 INJECTION, SOLUTION INTRAVENOUS; SUBCUTANEOUS at 11:11

## 2022-11-29 RX ADMIN — ATORVASTATIN CALCIUM 40 MG: 40 TABLET, FILM COATED ORAL at 08:11

## 2022-11-29 RX ADMIN — NYSTATIN 500000 UNITS: 100000 SUSPENSION ORAL at 09:11

## 2022-11-29 RX ADMIN — ENOXAPARIN SODIUM 40 MG: 40 INJECTION SUBCUTANEOUS at 04:11

## 2022-11-29 RX ADMIN — MUPIROCIN: 20 OINTMENT TOPICAL at 08:11

## 2022-11-29 RX ADMIN — LIDOCAINE HYDROCHLORIDE 5 ML: 20 SOLUTION ORAL at 01:11

## 2022-11-29 RX ADMIN — METOPROLOL SUCCINATE 25 MG: 25 TABLET, EXTENDED RELEASE ORAL at 10:11

## 2022-11-29 RX ADMIN — MUPIROCIN: 20 OINTMENT TOPICAL at 09:11

## 2022-11-29 RX ADMIN — INSULIN ASPART 5 UNITS: 100 INJECTION, SOLUTION INTRAVENOUS; SUBCUTANEOUS at 04:11

## 2022-11-29 RX ADMIN — POTASSIUM PHOSPHATE, MONOBASIC 500 MG: 500 TABLET, SOLUBLE ORAL at 08:11

## 2022-11-29 RX ADMIN — INSULIN DETEMIR 8 UNITS: 100 INJECTION, SOLUTION SUBCUTANEOUS at 08:11

## 2022-11-29 RX ADMIN — POTASSIUM BICARBONATE 20 MEQ: 391 TABLET, EFFERVESCENT ORAL at 01:11

## 2022-11-29 RX ADMIN — NYSTATIN 500000 UNITS: 100000 SUSPENSION ORAL at 08:11

## 2022-11-29 RX ADMIN — INSULIN ASPART 4 UNITS: 100 INJECTION, SOLUTION INTRAVENOUS; SUBCUTANEOUS at 11:11

## 2022-11-29 RX ADMIN — INSULIN ASPART 4 UNITS: 100 INJECTION, SOLUTION INTRAVENOUS; SUBCUTANEOUS at 04:11

## 2022-11-29 RX ADMIN — LIDOCAINE HYDROCHLORIDE 5 ML: 20 SOLUTION ORAL at 11:11

## 2022-11-29 RX ADMIN — SODIUM CHLORIDE: 0.9 INJECTION, SOLUTION INTRAVENOUS at 04:11

## 2022-11-29 RX ADMIN — PIPERACILLIN SODIUM AND TAZOBACTAM SODIUM 4.5 G: 4; .5 INJECTION, POWDER, LYOPHILIZED, FOR SOLUTION INTRAVENOUS at 08:11

## 2022-11-29 NOTE — PROVIDER TRANSFER
Transfer Note    62 year old woman with uterine cancer s/p recent hysterectomy, recent diagnosis of diabetes mellitus, HTN and obesity presented for evaluation of fatigue and shortness of breath.  She was diagnosed with DKA, possible UTI and debility.  DKA being treated in standard fashion.  Has significant sinus tachycardia which could be due to her acidosis and dehydration but I'm concerned for possible PE given her malignancy and recent surgery.  D-dimer is elevated so checking CTA chest.  CTA negative for PE.  BP medications held on admit.  Restarting home B blocker.  Had slurred speech on admit which has resolved.  Noted oral thrush so started nystatin.  Significant thrush and discomfort and added Fluconazole.  Has abnormal UA but urine cutlure only showing <50K yeast.  Stopped Zosyn.  PT/OT consulted and recommend HH at discharge.  Needs diabetic teaching as will need insulin upon discharge.  Hopefully home in 1-2 days.

## 2022-11-29 NOTE — EICU
eICU Physician Virtual/Remote Brief Evaluation Note      Message from RN  Patient requesting something for hiccups   Chart reviewed, patient observed , discussed with RN   Hiccups have been going on for several hours.  She has never had it for this length of time before  Trial of Protonix 40 mg p.o..  If this is not effective will try gabapentin      ASHLEY Bustamante MD  [facility] Attending  [phone]    This report has been created through the use of [employer] dictation software. Typographical and content errors may occur with this process. While efforts are made to detect and correct such errors, in some cases errors will persist. For this reason, wording in this document should be considered in the proper context and not strictly verbatim

## 2022-11-29 NOTE — PROGRESS NOTES
[facility] Medicine  Progress Note    Patient Name: Aleyda Benitez  MRN: [id number]  Patient Class: IP- Inpatient   Admission Date: 11/27/2022  Length of Stay: 2 days  Attending Physician: Valdemar Larson MD  Primary Care Provider: Dada Umana MD        Subjective:     Principal Problem:Diabetic ketoacidosis without coma associated with type 2 diabetes mellitus        HPI:    Aleyda Benitez is a 62 y.o. female who DM2, HTN, Obesity, OP, and Endometrial Adenocarcinoma has no past medical history on file, presented to the ED with Generalized Fatigue and High Home Glucoses.  Patient was just diagnosed with endometrial carcinoma a couple weeks ago, she had her uterus removed 6 days ago.  Does not sound like she is had a PET scan yet, lymph node biopsy still pending pathology at this point.  Patient was unaware she was diabetic before a couple weeks ago, when doing workup for the surgery.  She was starting on two oral diabetic medications.  Patient was unable to get sugar down at her house, and was brought into the emergency room after she was progressively worsening, including generalized weakness, hypotension, slurred speech.   recognized the slurring of speech last night, but got progressively worse this morning.  She has not had a bowel movement since her procedure 6 days ago.  She has polyuria and polydipsia.  Patient denies any abdominal discomfort associated with the procedure, denies protrusion.  She denies chest pain or shortness of breath.    In the ED, patient was found to be in DKA, with a pH of 7.075, bicarb 7, anion gap 22 and beta hydroxybutyrate 5.3.  She is severely dehydrated, with concentrated CBC.  Glucose 556.  Patient given fluids started insulin drip and placed in ICU.    Home meds:  Losartan 50 mg once daily  Metoprolol succ 50 mg once daily  Metformin 500 mg q24 once daily  Empagliflozin 10 mg once  Glargine 15U nightly (?)  On Care  everywhere  Medroxyprogesterone 10 mg       Overview/Hospital Course:  62 year old woman with uterine cancer s/p recent hysterectomy, recent diagnosis of diabetes mellitus, HTN and obesity presented for evaluation of fatigue and shortness of breath.  She was diagnosed with DKA, possible UTI and debility.  DKA being treated in standard fashion.  Has significant sinus tachycardia which could be due to her acidosis and dehydration but I'm concerned for possible PE given her malignancy and recent surgery.  D-dimer is elevated so checking CTA chest.  CTA negative for PE.  Had slurred speech on admit which has resolved.  Noted oral thrush so started nystatin.  Significant thrush and discomfort and added Fluconazole.  Has abnormal UA but urine cutlure only showing <50K yeast.  Stopped Zosyn.  PT/OT consulted and recommend HH at discharge.      Interval History: feeling better.    Review of Systems   HENT:  Negative for ear discharge and ear pain.    Eyes:  Negative for discharge and itching.   Endocrine: Negative for cold intolerance and heat intolerance.   Neurological:  Negative for seizures and syncope.   Objective:     Vital Signs (Most Recent):  Temp: 98.3 °F (36.8 °C) (11/29/22 0701)  Pulse: (!) 119 (11/29/22 0730)  Resp: 16 (11/29/22 0730)  BP: 118/62 (11/29/22 0701)  SpO2: 95 % (11/29/22 0730)   Vital Signs (24h Range):  Temp:  [97.9 °F (36.6 °C)-98.6 °F (37 °C)] 98.3 °F (36.8 °C)  Pulse:  [115-137] 119  Resp:  [15-36] 16  SpO2:  [95 %-99 %] 95 %  BP: ()/(61-78) 118/62     Weight: 71.2 kg (156 lb 15.5 oz)  Body mass index is 28.25 kg/m².    Intake/Output Summary (Last 24 hours) at 11/29/2022 0949  Last data filed at 11/29/2022 0937  Gross per 24 hour   Intake 4501.82 ml   Output 1400 ml   Net 3101.82 ml      Physical Exam  Vitals and nursing note reviewed.   Constitutional:       General: She is not in acute distress.     Appearance: She is not toxic-appearing or diaphoretic.   HENT:      Head: Normocephalic  and atraumatic.      Right Ear: External ear normal.      Left Ear: External ear normal.      Nose: Nose normal.      Mouth/Throat:      Comments: Oral thrush  Eyes:      General: No scleral icterus.     Extraocular Movements: Extraocular movements intact.      Conjunctiva/sclera: Conjunctivae normal.   Neck:      Thyroid: No thyromegaly.   Cardiovascular:      Rate and Rhythm: Regular rhythm. Tachycardia present.      Heart sounds: No murmur heard.  Pulmonary:      Effort: Pulmonary effort is normal.      Breath sounds: Normal breath sounds. No stridor. No wheezing or rales.   Abdominal:      General: There is no distension.      Palpations: Abdomen is soft. There is no mass.      Tenderness: There is no abdominal tenderness. There is no guarding.      Comments: Incisions from recent surgery are c/d/i   Musculoskeletal:         General: Normal range of motion.      Cervical back: Normal range of motion. No rigidity.      Right lower leg: No edema.      Left lower leg: No edema.   Skin:     General: Skin is warm and dry.      Capillary Refill: Capillary refill takes less than 2 seconds.      Coloration: Skin is not jaundiced.      Findings: No bruising.   Neurological:      Mental Status: She is alert and oriented to person, place, and time.      Cranial Nerves: No cranial nerve deficit.      Motor: No weakness.      Comments: Diffusely weak   Psychiatric:         Mood and Affect: Mood normal.         Behavior: Behavior normal.       Significant Labs: All pertinent labs within the past 24 hours have been reviewed.  BMP:   Recent Labs   Lab 11/29/22  0803   *      K 3.4*   *   CO2 21*   BUN 13   CREATININE 0.5   CALCIUM 9.3   MG 2.0     CBC:   Recent Labs   Lab 11/27/22  1412 11/28/22  0417 11/29/22  0403   WBC 12.91* 11.42 9.08   HGB 17.3* 14.0 12.3   HCT 53.1* 41.7 34.5*    326 247       Significant Imaging: I have reviewed all pertinent imaging results/findings within the past 24  hours.      Assessment/Plan:      * Diabetic ketoacidosis without coma associated with type 2 diabetes mellitus  -Admitted to inpatient status  -Noted recent diagnosis of DM by PCP and was hoping to be able to control with diet, metformin and jardiance.  -On admit glucose 556, anion gap 22, beta-hydroxybutyrate 5.3, pH 7.0 and bicarb 7.  -A1c 11.9  DKA now resolved with closure of AG.  Transition to SQ insulin.  Diabetic diet with insulin sliding scale.  Diabetic teaching.  -Will require insulin at discharge.    Debility  -Consult pt/ot    Abnormal urinalysis  NO UTI  Stop ABX    Tachycardia  -Still with significant tachycardia  -Sinus on EKG and no chest pain  -Troponin negative  -Procalcitonin and lactic acid are normal.  WBC was hemoconcentrated and has normalized.  Stopped vancomycin - continued zosyn for questionable UTI.  UCx only growing <50k yeast.  Stop zosyn.  -Could be secondary to her severe dehydration and acidosis, but given malignancy and very recent hysterectomy I am concerned about possible VTE.  No leg swelling or tenderness.  CTA negative for PE.  -Continue prophylactic lovenox.  Add B blocker.        Slurred speech  -Noted slurred speech, lethargy and generalized weakness on admit.  Still weak but no-longer lethargic and speech has almost completely normalized (I cannot appreciate slurring now)  -No focal deficits on exam and noted oral thrush.  -Suspect due to DKA, severe dehydration and oral thrush  -Started nystatin swish/swallow for thrush  Significant thrush.  Will add Fluconazole.  -Consult pt/ot    Endometrial adenocarcinoma  -S/p recent hysterectomy  -Incisions look good and no abdominal pain.  -Still awaiting to see if LN had cancer/mets, f/u path outpt    Essential hypertension  -BP solidly normal but rather tachycardic  -Holding home losartan  Start B blocker    Type 2 diabetes mellitus without complication, without long-term current use of insulin  -Treatment as above for DKA      VTE  Risk Mitigation (From admission, onward)         Ordered     enoxaparin injection 40 mg  Daily         11/27/22 1637     IP VTE LOW RISK PATIENT  Once         11/27/22 1439                Discharge Planning   COLE: 11/30/2022     Code Status: Not on file   Is the patient medically ready for discharge?:     Reason for patient still in hospital (select all that apply): Patient trending condition  Discharge Plan A: Home with family          Valdemar Larson MD  Department of Hospital Medicine   [facility] [place] - Intensive Care

## 2022-11-29 NOTE — ASSESSMENT & PLAN NOTE
-Still with significant tachycardia  -Sinus on EKG and no chest pain  -Troponin negative  -Procalcitonin and lactic acid are normal.  WBC was hemoconcentrated and has normalized.  Stopped vancomycin - continued zosyn for questionable UTI.  UCx only growing <50k yeast.  Stop zosyn.  -Could be secondary to her severe dehydration and acidosis, but given malignancy and very recent hysterectomy I am concerned about possible VTE.  No leg swelling or tenderness.  CTA negative for PE.  -Continue prophylactic lovenox.  Add B blocker.

## 2022-11-29 NOTE — NURSING TRANSFER
Nursing Transfer Note      11/29/2022     Reason patient is being transferred: LOC    Transfer To: room [id number] from [id number]    Transfer via bed    Transfer with cardiac monitoring    Transported by staff    Medicines sent: yes    Any special needs or follow-up needed: na    Chart send with patient: Yes    Notified: spouse    Patient reassessed at:  (date, time)    Upon arrival to floor: patient oriented to room, call bell in reach, and bed in lowest position

## 2022-11-29 NOTE — PT/OT/SLP PROGRESS
Occupational Therapy   Treatment    Name: Aleyda Benitez  MRN: [id number]  Admitting Diagnosis:  Diabetic ketoacidosis without coma associated with type 2 diabetes mellitus       Recommendations:     Discharge Recommendations: home health OT (with family assistance)  Discharge Equipment Recommendations:  bath bench (long-handled sponge)  Barriers to discharge:  None    Assessment:     Aleyda Benitez is a 62 y.o. female with a medical diagnosis of Diabetic ketoacidosis without coma associated with type 2 diabetes mellitus. Performance deficits affecting function are weakness, impaired endurance, impaired self care skills, decreased upper extremity function, decreased lower extremity function, impaired functional mobility, impaired balance, impaired cardiopulmonary response to activity.     SBA for sit>stand and few steps bed>chair using RW. HR to 134 bpm with activity.      Pt clarified that she has a RW at home. OT rec TTB and long-handled sponge at d/c in order to increase safety with ADLs.     Rehab Prognosis:  Good; patient would benefit from acute skilled OT services to address these deficits and reach maximum level of function.       Plan:     Patient to be seen  (3-5x/week) to address the above listed problems via self-care/home management, therapeutic exercises, therapeutic activities  Plan of Care Expires: 12/12/22  Plan of Care Reviewed with: patient, spouse    Subjective     Chief complaint: hiccups ongoing overnight   Patient/family comments/ goals: feels much better today vs. Yesterday; felt good to complete ADLs; pt felt like she's lost weight since admission     Pain/Comfort:  Pain Rating 1: 0/10    Objective:     Communicated with: nurse, Nieves, and discussed elevated HR from eval during ICU rounds with Dr. Larson prior to session.  Patient found HOB elevated with peripheral IV, PureWick, blood pressure cuff, pulse ox (continuous), telemetry upon OT entry to room. MD reported in rounds to monitor HR  with update after.     General Precautions: Standard, fall, diabetic   Orthopedic Precautions:N/A   Braces: N/A  Respiratory Status: Room air     Occupational Performance:     Bed Mobility:    Patient completed Log Rolling to Left with stand by assistance and with side rail  Patient completed Scooting anteriorly with stand by assistance  Patient completed Supine to Sit with minimum assistance     Functional Mobility/Transfers:  Patient completed Sit <> Stand Transfer with stand by assistance  with  rolling walker   Patient completed Bed <> Chair Transfer using Step Transfer technique with stand by assistance with rolling walker  Functional Mobility: pt completed few steps bed>chair using RW with SBA.     Activities of Daily Living:  Grooming: modified independence to brush teeth, use mouthwash, and wash face seated upright in the chair; pt used BUE to scrub hair with shower cap w/ soap seated in the chair     [facility] 6 Click ADL: 21    Treatment & Education:  Pt re-educated on OT role/POC.   Importance of OOB activity with staff assistance.  Safety during functional t/f and mobility   Seated unsupported at EOB, pt completed the following therapeutic exercises:   X10 elbow flex/ext AROM, ankle pumps AROM 2* dizziness; symptoms resolved briefly with time  Edu on pacing of self with change of positions, e.g. not rushing to a commode   Pt kept BUE shoulders and elbows flexed to scrub shower cap with soap to wash hair to complete ADL along with BUE AROM. HR stayed in 120s bpm. Pt compensated with BUE at times and rested 2* weakness/fatigue   Encouraged BUE AROM 2-3x/day, 10 reps each: shoulder flex/ext, forward punches, seated marching, ankle pumps   Multiple self-care tasks/functional mobility completed- assistance level noted above   All questions/concerns answered within OT scope of practice       Patient left up in chair with all lines intact, call button in reach, nurse, Nieves, and Dr. Larson notified,  present,  and all needs met/within reach     GOALS:   Multidisciplinary Problems       Occupational Therapy Goals          Problem: Occupational Therapy    Goal Priority Disciplines Outcome Interventions   Occupational Therapy Goal     OT, PT/OT Ongoing, Progressing    Description: Goals to be met by: 12/12/22     Patient will increase functional independence with ADLs by performing:    UE Dressing with Modified [place].  LE Dressing with Modified [place].  Grooming while standing at sink with Modified [place].  Toileting from toilet with Modified [place] for hygiene and clothing management.   Supine to sit with Modified [place].  Step transfer with Modified [place]  Toilet transfer to toilet with Modified [place].  Upper extremity exercise program x15 reps per handout, with independence.                         Time Tracking:     OT Date of Treatment: 11/29/22  OT Start Time: 1119  OT Stop Time: 1151  OT Total Time (min): 32 min    Billable Minutes:Self Care/Home Management 20 min  Therapeutic Activity 12 min  Total Time 32 min    OT/CHIQUITA: OT     CHIQUITA Visit Number: 0    11/29/2022

## 2022-11-29 NOTE — ASSESSMENT & PLAN NOTE
-Admitted to inpatient status  -Noted recent diagnosis of DM by PCP and was hoping to be able to control with diet, metformin and jardiance.  -On admit glucose 556, anion gap 22, beta-hydroxybutyrate 5.3, pH 7.0 and bicarb 7.  -A1c 11.9  DKA now resolved with closure of AG.  Transition to SQ insulin.  Diabetic diet with insulin sliding scale.  Diabetic teaching.  -Will require insulin at discharge.

## 2022-11-29 NOTE — ASSESSMENT & PLAN NOTE
-Noted slurred speech, lethargy and generalized weakness on admit.  Still weak but no-longer lethargic and speech has almost completely normalized (I cannot appreciate slurring now)  -No focal deficits on exam and noted oral thrush.  -Suspect due to DKA, severe dehydration and oral thrush  -Started nystatin swish/swallow for thrush  Significant thrush.  Will add Fluconazole.  -Consult pt/ot

## 2022-11-29 NOTE — EICU
eICU Physician Virtual/Remote Brief Evaluation Note      Message from RN   K 3.2 and phosphorus 1.0  Chart reviewed   Admitted for DKA  K decreased from 3.6.  Phosphorus decreased from 1.3  Creatinine 0.7, bicarb 21, glucose 220  Potassium phosphate 30 millimoles over 6 hours   Potassium bicarbonate 20 mEq p.o.      B. Khalif Bustamante MD  [facility] Attending  [phone]    This report has been created through the use of M-Modal dictation software. Typographical and content errors may occur with this process. While efforts are made to detect and correct such errors, in some cases errors will persist. For this reason, wording in this document should be considered in the proper context and not strictly verbatim

## 2022-11-29 NOTE — NURSING
[facility]  Nurses Note -- 4 Eyes      11/29/2022       Skin assessed on: Q Shift         No Pressure Injuries Present    [x]Prevention Measures Documented    [] Yes LDA  for Pressure Injury Previously documented     [] Yes New Pressure Injury Discovered   [] LDA for New Pressure Injury Added      Attending RN:  Nieves Santos RN     Second RN:  Vanessa BYRNES

## 2022-11-29 NOTE — NURSING
[facility]  ICU Shift Summary  Date: 11/29/2022      Prehospitalization: Home  Admit Date / LOS : 11/27/2022/ 2 days    Diagnosis: Diabetic ketoacidosis without coma associated with type 2 diabetes mellitus    Consults:        Active: OT, PT, Pulm CC, ST, and SW       Needed: N/A     Code Status: No Order   Advanced Directive: <no information>    LDA:  Lines/Drains/Airways       Drain  Duration             Female External Urinary Catheter 11/28/22 1000 <1 day              Peripheral Intravenous Line  Duration                  Peripheral IV - Single Lumen 11/27/22 1310 22 G Left Hand 1 day         Peripheral IV - Single Lumen 11/27/22 1350 20 G Right Antecubital 1 day         Peripheral IV - Single Lumen 11/27/22 1400 20 G Right Forearm 1 day                  Central Lines/Site/Justification:Multiple GTTS  Urinary Cath/Order/Justification:Patient Does Not Have Urinary Catheter    Vasopressors/Infusions:    sodium chloride 0.9% 125 mL/hr at 11/29/22 0519    dextrose 5 % and 0.45 % NaCl Stopped (11/29/22 0519)    insulin regular 1 units/mL infusion orderable (DKA) 5.8 Units/hr (11/29/22 0502)          GOALS: Volume/ Hemodynamic: MAP >                     RASS: 0  alert and calm    Pain Management: none       Pain Controlled: no     Rhythm: ST    Respiratory Device: Room Air                  Most Recent SBT/ SAT: Pass       MOVE Screen: FAIL  ICU Liberation: no    VTE Prophylaxis: Pharm and Mechanical  Mobility: Bedrest  Stress Ulcer Prophylaxis: Yes    Isolation: No active isolations    Dietary:   Current Diet Order   No orders of the defined types were placed in this encounter.      Tolerance: yes  Advancement: @ goal    I & O (24h):    Intake/Output Summary (Last 24 hours) at 11/29/2022 0546  Last data filed at 11/29/2022 0518  Gross per 24 hour   Intake 5961.63 ml   Output 1650 ml   Net 4311.63 ml        Restraints: No    Significant Dates:  Post Op Date: N/A  Rescue Date: N/A  Imaging/ Diagnostics:  N/A    Noteworthy Labs:  see list      COVID Test: (--)  CBC/Anemia Labs: Coags:    Recent Labs   Lab 11/28/22  0417 11/29/22  0403   WBC 11.42 9.08   HGB 14.0 12.3   HCT 41.7 34.5*    247   MCV 89 87   RDW 13.3 13.4    Recent Labs   Lab 11/27/22  1412   INR 1.0        Chemistries:   Recent Labs   Lab 11/27/22  1412 11/27/22  1951 11/29/22  0005 11/29/22  0403      < > 145 144   K 3.3*   < > 3.2* 3.3*   *   < > 115* 114*   CO2 7*   < > 21* 21*   BUN 30*   < > 14 12   CREATININE 1.5*   < > 0.7 0.6   CALCIUM 11.4*   < > 9.6 9.5   PROT 8.3  --   --   --    BILITOT 0.3  --   --   --    ALKPHOS 79  --   --   --    ALT 22  --   --   --    AST 9*  --   --   --    MG  --    < > 2.1 2.0   PHOS  --    < > 1.0* 2.0*    < > = values in this interval not displayed.        Cardiac Enzymes: Ejection Fractions:    Recent Labs     11/27/22  1412   TROPONINI 0.009    No results found for: EF     POCT Glucose: HbA1c:    Recent Labs   Lab 11/29/22  0305 11/29/22  0403 11/29/22  0456   POCTGLUCOSE 257* 265* 234*    Hemoglobin A1C   Date Value Ref Range Status   11/27/2022 10.5 (H) 4.0 - 5.6 % Final     Comment:     ADA Screening Guidelines:  5.7-6.4%  Consistent with prediabetes  >or=6.5%  Consistent with diabetes    High levels of fetal hemoglobin interfere with the HbA1C  assay. Heterozygous hemoglobin variants (HbS, HgC, etc)do  not significantly interfere with this assay.   However, presence of multiple variants may affect accuracy.             ICU LOS 1d 11h  Level of Care: Critical Care    Chart Check: 12 HR Done  Shift Summary/Plan for the shift: none

## 2022-11-29 NOTE — NURSING
[facility]  Nurses Note -- 4 Eyes      11/29/2022       Skin assessed on: Q Shift      [x] No Pressure Injuries Present    [x]Prevention Measures Documented    [] Yes LDA  for Pressure Injury Previously documented     [] Yes New Pressure Injury Discovered   [] LDA for New Pressure Injury Added      Attending RN:  Rome Márquez, FITZ     Second RN:

## 2022-11-29 NOTE — PLAN OF CARE
Problem: Occupational Therapy  Goal: Occupational Therapy Goal  Description: Goals to be met by: 12/12/22     Patient will increase functional independence with ADLs by performing:    UE Dressing with Modified [place].  LE Dressing with Modified [place].  Grooming while standing at sink with Modified [place].  Toileting from toilet with Modified [place] for hygiene and clothing management.   Supine to sit with Modified [place].  Step transfer with Modified [place]  Toilet transfer to toilet with Modified [place].  Upper extremity exercise program x15 reps per handout, with independence.    Outcome: Ongoing, Progressing     SBA for sit>stand and few steps bed>chair using RW. HR to 134 bpm with activity.     Pt clarified that she has a RW at home. OT rec TTB and long-handled sponge at d/c in order to increase safety with ADLs.

## 2022-11-30 LAB
ANION GAP SERPL CALC-SCNC: 8 MMOL/L (ref 8–16)
BACTERIA UR CULT: ABNORMAL
BASOPHILS # BLD AUTO: 0.01 K/UL (ref 0–0.2)
BASOPHILS NFR BLD: 0.1 % (ref 0–1.9)
BUN SERPL-MCNC: 11 MG/DL (ref 6–20)
CALCIUM SERPL-MCNC: 9 MG/DL (ref 8.7–10.5)
CHLORIDE SERPL-SCNC: 111 MMOL/L (ref 95–110)
CO2 SERPL-SCNC: 22 MMOL/L (ref 23–29)
CREAT SERPL-MCNC: 0.5 MG/DL (ref 0.5–1.4)
DIFFERENTIAL METHOD: ABNORMAL
EOSINOPHIL # BLD AUTO: 0 K/UL (ref 0–0.5)
EOSINOPHIL NFR BLD: 0.5 % (ref 0–8)
ERYTHROCYTE [DISTWIDTH] IN BLOOD BY AUTOMATED COUNT: 13.7 % (ref 11.5–14.5)
EST. GFR  (NO RACE VARIABLE): >60 ML/MIN/1.73 M^2
GLUCOSE SERPL-MCNC: 174 MG/DL (ref 70–110)
HCT VFR BLD AUTO: 31.5 % (ref 37–48.5)
HGB BLD-MCNC: 10.4 G/DL (ref 12–16)
IMM GRANULOCYTES # BLD AUTO: 0.13 K/UL (ref 0–0.04)
IMM GRANULOCYTES NFR BLD AUTO: 1.8 % (ref 0–0.5)
LYMPHOCYTES # BLD AUTO: 2.9 K/UL (ref 1–4.8)
LYMPHOCYTES NFR BLD: 40.1 % (ref 18–48)
MAGNESIUM SERPL-MCNC: 2 MG/DL (ref 1.6–2.6)
MCH RBC QN AUTO: 29.9 PG (ref 27–31)
MCHC RBC AUTO-ENTMCNC: 33 G/DL (ref 32–36)
MCV RBC AUTO: 91 FL (ref 82–98)
MONOCYTES # BLD AUTO: 0.5 K/UL (ref 0.3–1)
MONOCYTES NFR BLD: 7.4 % (ref 4–15)
NEUTROPHILS # BLD AUTO: 3.7 K/UL (ref 1.8–7.7)
NEUTROPHILS NFR BLD: 50.1 % (ref 38–73)
NRBC BLD-RTO: 0 /100 WBC
PHOSPHATE SERPL-MCNC: 2.1 MG/DL (ref 2.7–4.5)
PLATELET # BLD AUTO: 213 K/UL (ref 150–450)
PMV BLD AUTO: 9.5 FL (ref 9.2–12.9)
POCT GLUCOSE: 159 MG/DL (ref 70–110)
POCT GLUCOSE: 172 MG/DL (ref 70–110)
POCT GLUCOSE: 174 MG/DL (ref 70–110)
POCT GLUCOSE: 254 MG/DL (ref 70–110)
POTASSIUM SERPL-SCNC: 3.2 MMOL/L (ref 3.5–5.1)
RBC # BLD AUTO: 3.48 M/UL (ref 4–5.4)
SODIUM SERPL-SCNC: 141 MMOL/L (ref 136–145)
WBC # BLD AUTO: 7.3 K/UL (ref 3.9–12.7)

## 2022-11-30 PROCEDURE — 97116 GAIT TRAINING THERAPY: CPT | Mod: CQ

## 2022-11-30 PROCEDURE — 85025 COMPLETE CBC W/AUTO DIFF WBC: CPT | Performed by: HOSPITALIST

## 2022-11-30 PROCEDURE — 63600175 PHARM REV CODE 636 W HCPCS: Performed by: HOSPITALIST

## 2022-11-30 PROCEDURE — 11000001 HC ACUTE MED/SURG PRIVATE ROOM

## 2022-11-30 PROCEDURE — 97530 THERAPEUTIC ACTIVITIES: CPT | Mod: CQ

## 2022-11-30 PROCEDURE — 25000003 PHARM REV CODE 250: Performed by: HOSPITALIST

## 2022-11-30 PROCEDURE — 97110 THERAPEUTIC EXERCISES: CPT | Mod: CQ

## 2022-11-30 PROCEDURE — 36415 COLL VENOUS BLD VENIPUNCTURE: CPT | Performed by: HOSPITALIST

## 2022-11-30 PROCEDURE — 63700000 PHARM REV CODE 250 ALT 637 W/O HCPCS: Performed by: HOSPITALIST

## 2022-11-30 PROCEDURE — 97535 SELF CARE MNGMENT TRAINING: CPT

## 2022-11-30 RX ADMIN — INSULIN ASPART 2 UNITS: 100 INJECTION, SOLUTION INTRAVENOUS; SUBCUTANEOUS at 05:11

## 2022-11-30 RX ADMIN — ASPIRIN 81 MG: 81 TABLET, DELAYED RELEASE ORAL at 08:11

## 2022-11-30 RX ADMIN — INSULIN ASPART 5 UNITS: 100 INJECTION, SOLUTION INTRAVENOUS; SUBCUTANEOUS at 08:11

## 2022-11-30 RX ADMIN — NYSTATIN 500000 UNITS: 100000 SUSPENSION ORAL at 08:11

## 2022-11-30 RX ADMIN — POTASSIUM PHOSPHATE, MONOBASIC 500 MG: 500 TABLET, SOLUBLE ORAL at 08:11

## 2022-11-30 RX ADMIN — ATORVASTATIN CALCIUM 40 MG: 40 TABLET, FILM COATED ORAL at 08:11

## 2022-11-30 RX ADMIN — INSULIN ASPART 5 UNITS: 100 INJECTION, SOLUTION INTRAVENOUS; SUBCUTANEOUS at 05:11

## 2022-11-30 RX ADMIN — INSULIN ASPART 2 UNITS: 100 INJECTION, SOLUTION INTRAVENOUS; SUBCUTANEOUS at 12:11

## 2022-11-30 RX ADMIN — MUPIROCIN: 20 OINTMENT TOPICAL at 08:11

## 2022-11-30 RX ADMIN — INSULIN DETEMIR 10 UNITS: 100 INJECTION, SOLUTION SUBCUTANEOUS at 08:11

## 2022-11-30 RX ADMIN — ENOXAPARIN SODIUM 40 MG: 40 INJECTION SUBCUTANEOUS at 05:11

## 2022-11-30 RX ADMIN — METOPROLOL SUCCINATE 25 MG: 25 TABLET, EXTENDED RELEASE ORAL at 08:11

## 2022-11-30 RX ADMIN — INSULIN ASPART 5 UNITS: 100 INJECTION, SOLUTION INTRAVENOUS; SUBCUTANEOUS at 12:11

## 2022-11-30 RX ADMIN — NYSTATIN 500000 UNITS: 100000 SUSPENSION ORAL at 12:11

## 2022-11-30 RX ADMIN — NYSTATIN 500000 UNITS: 100000 SUSPENSION ORAL at 05:11

## 2022-11-30 RX ADMIN — INSULIN ASPART 3 UNITS: 100 INJECTION, SOLUTION INTRAVENOUS; SUBCUTANEOUS at 08:11

## 2022-11-30 RX ADMIN — FLUCONAZOLE 100 MG: 100 TABLET ORAL at 08:11

## 2022-11-30 RX ADMIN — INSULIN ASPART 2 UNITS: 100 INJECTION, SOLUTION INTRAVENOUS; SUBCUTANEOUS at 08:11

## 2022-11-30 RX ADMIN — PANTOPRAZOLE SODIUM 40 MG: 40 TABLET, DELAYED RELEASE ORAL at 08:11

## 2022-11-30 NOTE — NURSING
[facility]  Nurses Note -- 4 Eyes      11/30/2022       Skin assessed on: Wound Wednesday      [x] No Pressure Injuries Present    []Prevention Measures Documented    [] Yes LDA  for Pressure Injury Previously documented     [] Yes New Pressure Injury Discovered   [] LDA for New Pressure Injury Added      Attending RN:  Claudine Mercado RN     Second: CARINA Steiner

## 2022-11-30 NOTE — PT/OT/SLP PROGRESS
"Physical Therapy Treatment    Patient Name:  Aleyda Benitez   MRN:  [id number]    Recommendations:     Discharge Recommendations:  home health PT (Family support)   Discharge Equipment Recommendations:  (Ongoing assessemnt pending pt progress, RW, BSC, TTB?)   Barriers to discharge: None    Assessment:     Aleyda Benitez is a 62 y.o. female admitted with a medical diagnosis of Diabetic ketoacidosis without coma associated with type 2 diabetes mellitus.  She presents with the following impairments/functional limitations:  weakness, impaired endurance, impaired functional mobility, gait instability, impaired balance, decreased lower extremity function, decreased safety awareness, impaired cardiopulmonary response to activity, impaired skin, edema.    Pt required CGA for transfer/ambulation using RW. Pt stated that she feel much better standing up and walking.  bpm at rest; -125 bpm with activity; SpO2 97%. Pt is [facility] post treatment with Spouse present, nurse notified.    Rehab Prognosis: Good; patient would benefit from acute skilled PT services to address these deficits and reach maximum level of function.    Recent Surgery: * No surgery found *      Plan:     During this hospitalization, patient to be seen  (5-6x/wk) to address the identified rehab impairments via gait training, therapeutic activities, therapeutic exercises and progress toward the following goals:    Plan of Care Expires:  12/12/22    Subjective     Chief Complaint: "I need to use the bathroom."   Patient/Family Comments/goals: Pt agreed to participate.  Pain/Comfort:  Pain Rating 1: 0/10  Pain Rating Post-Intervention 1: 0/10      Objective:     Communicated with nurse Chow prior to session.  Patient found sitting edge of bed with telemetry, peripheral IV, Spouse present upon PT entry to room.     General Precautions: Standard, fall, diabetic   Orthopedic Precautions:N/A   Braces:    Respiratory Status: Room air     Functional " Mobility:  Transfers:   Gait Belt don prior OOB activity  Sit to Stand: from EOB and Toilet with stand by assistance with rolling walker  Bed to Chair: contact guard assistance with rolling walker using  Step Transfer  Toilet Transfer: contact guard assistance with rolling walker using  Step Transfer  Gait: Patient ambulated ~10, ~10, ~40  feet on level tile with Rolling Walker with Contact Guard Assistance. Pt with demonstrating a  reciprocal gait with min lateral sway, min unsteadiness, decreased sonny and decreased step length. Impairments contributing to gait deviations include impaired balance and decreased strength; v/c for AD management, straight her L foot, increase step length, and to allow self corrections.  Balance: Fair+ Sitting; Fair Standing      AM-PAC 6 CLICK MOBILITY  Turning over in bed (including adjusting bedclothes, sheets and blankets)?: 3  Sitting down on and standing up from a chair with arms (e.g., wheelchair, bedside commode, etc.): 3  Moving from lying on back to sitting on the side of the bed?: 3  Moving to and from a bed to a chair (including a wheelchair)?: 3  Need to walk in hospital room?: 3  Climbing 3-5 steps with a railing?: 2  Basic Mobility Total Score: 17       Treatment & Education:  Educated pt on benefits of OOB activity and performing BLE ex throughout the day, pt verbalized understanding  BLE ex in seated 10 reps x 2 : AP, LAQ, PS    Patient left up in chair on green cushion in reclined position with tray table in front, BLE offloaded by pillow, BSC, call button in reach, nurse notified, and Spouse present.    GOALS:   Multidisciplinary Problems       Physical Therapy Goals          Problem: Physical Therapy    Goal Priority Disciplines Outcome Goal Variances Interventions   Physical Therapy Goal     PT, PT/OT Ongoing, Progressing     Description: Goals to be met by: 22     Patient will increase functional independence with mobility by performin. Supine to  sit with Modified [place]  2. Sit to stand transfer with Modified [place]  3. Gait  x  feet with Modified [place] using Rolling Walker.   4. Lower extremity exercise program x10 reps per handout, with supervision                         Time Tracking:     PT Received On: 11/30/22  PT Start Time: 1031     PT Stop Time: 1109  PT Total Time (min): 38 min     Billable Minutes: Gait Training 10 min, Therapeutic Activity 14 min, and Therapeutic Exercise 14 min    Treatment Type: Treatment  PT/PTA: PTA     PTA Visit Number: 1     11/30/2022

## 2022-11-30 NOTE — PLAN OF CARE
Problem: Adult Inpatient Plan of Care  Goal: Plan of Care Review  Outcome: Ongoing, Progressing     Problem: Diabetes Comorbidity  Goal: Blood Glucose Level Within Targeted Range  Outcome: Ongoing, Progressing     Problem: Skin Injury Risk Increased  Goal: Skin Health and Integrity  Outcome: Ongoing, Progressing     Problem: Fall Injury Risk  Goal: Absence of Fall and Fall-Related Injury  Outcome: Ongoing, Progressing

## 2022-11-30 NOTE — PT/OT/SLP PROGRESS
Occupational Therapy   Treatment    Name: Aleyda Benitez  MRN: [id number]  Admitting Diagnosis:  Diabetic ketoacidosis without coma associated with type 2 diabetes mellitus       Recommendations:     Discharge Recommendations: home health OT (with family assistance)  Discharge Equipment Recommendations:  bath bench (long-handled sponge)  Barriers to discharge:  None    Assessment:     Aleyda Benitez is a 62 y.o. female with a medical diagnosis of Diabetic ketoacidosis without coma associated with type 2 diabetes mellitus. Performance deficits affecting function are weakness, impaired endurance, impaired self care skills, impaired functional mobility, impaired balance.     Pt progressing well towards goals. Supervision with RW for standing grooming ADLs at the sink. Speech more intelligible.     Rehab Prognosis:  Good; patient would benefit from acute skilled OT services to address these deficits and reach maximum level of function.       Plan:     Patient to be seen 3 x/week to address the above listed problems via self-care/home management, therapeutic activities, therapeutic exercises  Plan of Care Expires: 12/12/22  Plan of Care Reviewed with: patient, spouse    Subjective     Chief complaint: N/A  Patient/family comments/ goals: happy to be out of ICU and getting stronger     Pain/Comfort:  Pain Rating 1: 0/10    Objective:     Communicated with: nurseClaudine, prior to session. Patient found  reclined in the chair  with telemetry, peripheral IV upon OT entry to room.    General Precautions: Standard, fall, diabetic   Orthopedic Precautions:N/A   Braces: N/A  Respiratory Status: Room air     Occupational Performance:     Bed Mobility:    NT; pt left/up in the chair     Functional Mobility/Transfers:  Patient completed Sit <> Stand Transfer with stand by assistance  with  rolling walker   Functional Mobility: Gait belt donned prior to transfer for safety during mobility/transfers. Verbal cueing for body mechanics  for sit>stand from low chair. SBA with in-room functional mobility using RW. Verbal cueing for RW placement at the sink.     Activities of Daily Living:  Grooming: supervision standing at the sink within RW to wash face and underarms/chest area with warm cloth   Upper Body Dressing: modified independence seated in the chair to change gowns       [facility] 6 Click ADL: 22    Treatment & Education:  Pt re-educated on OT role/POC.   Importance of OOB activity with staff assistance using RW to the bathroom or spouse supervision to [facility].  Safety during functional t/f and mobility   Multiple self-care tasks/functional mobility completed- assistance level noted above   All questions/concerns answered within OT scope of practice       Patient left  reclined in the chair with B/L heels offloaded by pillow  with all lines intact, call button in reach, spouse present, and all needs met/within reach    GOALS:   Multidisciplinary Problems       Occupational Therapy Goals          Problem: Occupational Therapy    Goal Priority Disciplines Outcome Interventions   Occupational Therapy Goal     OT, PT/OT Ongoing, Progressing    Description: Goals to be met by: 12/12/22     Patient will increase functional independence with ADLs by performing:    UE Dressing with Modified [place].  LE Dressing with Modified [place].  Grooming while standing at sink with Modified [place].  Toileting from toilet with Modified [place] for hygiene and clothing management.   Supine to sit with Modified [place].  Step transfer with Modified [place]  Toilet transfer to toilet with Modified [place].  Upper extremity exercise program x15 reps per handout, with independence.                         Time Tracking:     OT Date of Treatment: 11/30/22  OT Start Time: 1137  OT Stop Time: 1152  OT Total Time (min): 15 min    Billable Minutes:Self Care/Home Management 15 min    OT/CHIQUITA: OT     CHIQUITA Visit Number: 0    11/30/2022

## 2022-11-30 NOTE — SUBJECTIVE & OBJECTIVE
Interval History: Pt feeling much better today. Pt denies cp, sob, fever, chills, n/v at this time. Pt was able to tolerate her breakfast and has had no overnight issues. Pt does states she has lantus at home, but is unclear on what dose she was on.    Review of Systems  Objective:     Vital Signs (Most Recent):  Temp: 98.7 °F (37.1 °C) (11/30/22 1204)  Pulse: 102 (11/30/22 1204)  Resp: 20 (11/30/22 1204)  BP: 102/71 (11/30/22 1204)  SpO2: 96 % (11/30/22 1204) Vital Signs (24h Range):  Temp:  [98.1 °F (36.7 °C)-98.7 °F (37.1 °C)] 98.7 °F (37.1 °C)  Pulse:  [] 102  Resp:  [15-24] 20  SpO2:  [95 %-100 %] 96 %  BP: (102-160)/(69-87) 102/71     Weight: 71.2 kg (156 lb 15.5 oz)  Body mass index is 28.25 kg/m².    Intake/Output Summary (Last 24 hours) at 11/30/2022 1321  Last data filed at 11/30/2022 0808  Gross per 24 hour   Intake 1402.33 ml   Output 750 ml   Net 652.33 ml      Physical Exam  Vitals reviewed.   Constitutional:       General: She is not in acute distress.     Appearance: She is not ill-appearing.   HENT:      Head: Normocephalic and atraumatic.      Nose: No congestion or rhinorrhea.      Mouth/Throat:      Mouth: Mucous membranes are moist.      Pharynx: Oropharynx is clear.   Eyes:      General: No scleral icterus.     Extraocular Movements: Extraocular movements intact.   Cardiovascular:      Rate and Rhythm: Normal rate and regular rhythm.   Pulmonary:      Effort: Pulmonary effort is normal. No respiratory distress.      Breath sounds: Normal breath sounds.   Abdominal:      Palpations: Abdomen is soft.      Tenderness: There is no abdominal tenderness.   Musculoskeletal:         General: No swelling or tenderness.      Cervical back: Neck supple. No rigidity.   Skin:     General: Skin is warm and dry.   Neurological:      General: No focal deficit present.      Mental Status: She is alert and oriented to person, place, and time.   Psychiatric:         Mood and Affect: Mood normal.          Behavior: Behavior normal.       Significant Labs: All pertinent labs within the past 24 hours have been reviewed.    Significant Imaging: I have reviewed all pertinent imaging results/findings within the past 24 hours.

## 2022-11-30 NOTE — PLAN OF CARE
Pt has remained with ST today but rate better since re-starting Toprol XL. HR now 100's.She has remained safe and free of injury. She will transfer to room 412. CBG's 200's.She has denied post-op pain thru out shift.

## 2022-11-30 NOTE — PLAN OF CARE
Problem: Physical Therapy  Goal: Physical Therapy Goal  Description: Goals to be met by: 22     Patient will increase functional independence with mobility by performin. Supine to sit with Modified [place]  2. Sit to stand transfer with Modified [place]  3. Gait  x  feet with Modified [place] using Rolling Walker.   4. Lower extremity exercise program x10 reps per handout, with supervision    Outcome: Ongoing, Progressing   Pt required CGA for transfer/ambulation using RW. Pt stated that she feel much better standing up and walking.  bpm at rest; -125 bpm with activity; SpO2 97%. Pt is [facility] post treatment with Spouse present, nurse notified.

## 2022-11-30 NOTE — PROGRESS NOTES
[facility] Medicine  Progress Note    Patient Name: Aleyda Benitez  MRN: [id number]  Patient Class: IP- Inpatient   Admission Date: 11/27/2022  Length of Stay: 3 days  Attending Physician: Todd Mcclelland III, MD  Primary Care Provider: Dada Umana MD        Subjective:     Principal Problem:Diabetic ketoacidosis without coma associated with type 2 diabetes mellitus        HPI:    Aleyda Benitez is a 62 y.o. female who DM2, HTN, Obesity, OP, and Endometrial Adenocarcinoma has no past medical history on file, presented to the ED with Generalized Fatigue and High Home Glucoses.  Patient was just diagnosed with endometrial carcinoma a couple weeks ago, she had her uterus removed 6 days ago.  Does not sound like she is had a PET scan yet, lymph node biopsy still pending pathology at this point.  Patient was unaware she was diabetic before a couple weeks ago, when doing workup for the surgery.  She was starting on two oral diabetic medications.  Patient was unable to get sugar down at her house, and was brought into the emergency room after she was progressively worsening, including generalized weakness, hypotension, slurred speech.   recognized the slurring of speech last night, but got progressively worse this morning.  She has not had a bowel movement since her procedure 6 days ago.  She has polyuria and polydipsia.  Patient denies any abdominal discomfort associated with the procedure, denies protrusion.  She denies chest pain or shortness of breath.    In the ED, patient was found to be in DKA, with a pH of 7.075, bicarb 7, anion gap 22 and beta hydroxybutyrate 5.3.  She is severely dehydrated, with concentrated CBC.  Glucose 556.  Patient given fluids started insulin drip and placed in ICU.    Home meds:  Losartan 50 mg once daily  Metoprolol succ 50 mg once daily  Metformin 500 mg q24 once daily  Empagliflozin 10 mg once  Glargine 15U nightly (?)  On Care  everywhere  Medroxyprogesterone 10 mg       Overview/Hospital Course:  62 year old woman with uterine cancer s/p recent hysterectomy, recent diagnosis of diabetes mellitus, HTN and obesity presented for evaluation of fatigue and shortness of breath.  She was diagnosed with DKA, possible UTI and debility.  DKA being treated in standard fashion.  Has significant sinus tachycardia which could be due to her acidosis and dehydration but I'm concerned for possible PE given her malignancy and recent surgery.  D-dimer is elevated so checking CTA chest.  CTA negative for PE.  Had slurred speech on admit which has resolved.  Noted oral thrush so started nystatin.  Significant thrush and discomfort and added Fluconazole.  Has abnormal UA but urine cutlure only showing <50K yeast.  Stopped Zosyn.  PT/OT consulted and recommend HH at discharge.      Interval History: Pt feeling much better today. Pt denies cp, sob, fever, chills, n/v at this time. Pt was able to tolerate her breakfast and has had no overnight issues. Pt does states she has lantus at home, but is unclear on what dose she was on.    Review of Systems  Objective:     Vital Signs (Most Recent):  Temp: 98.7 °F (37.1 °C) (11/30/22 1204)  Pulse: 102 (11/30/22 1204)  Resp: 20 (11/30/22 1204)  BP: 102/71 (11/30/22 1204)  SpO2: 96 % (11/30/22 1204) Vital Signs (24h Range):  Temp:  [98.1 °F (36.7 °C)-98.7 °F (37.1 °C)] 98.7 °F (37.1 °C)  Pulse:  [] 102  Resp:  [15-24] 20  SpO2:  [95 %-100 %] 96 %  BP: (102-160)/(69-87) 102/71     Weight: 71.2 kg (156 lb 15.5 oz)  Body mass index is 28.25 kg/m².    Intake/Output Summary (Last 24 hours) at 11/30/2022 1321  Last data filed at 11/30/2022 0808  Gross per 24 hour   Intake 1402.33 ml   Output 750 ml   Net 652.33 ml      Physical Exam  Vitals reviewed.   Constitutional:       General: She is not in acute distress.     Appearance: She is not ill-appearing.   HENT:      Head: Normocephalic and atraumatic.      Nose: No  congestion or rhinorrhea.      Mouth/Throat:      Mouth: Mucous membranes are moist.      Pharynx: Oropharynx is clear.   Eyes:      General: No scleral icterus.     Extraocular Movements: Extraocular movements intact.   Cardiovascular:      Rate and Rhythm: Normal rate and regular rhythm.   Pulmonary:      Effort: Pulmonary effort is normal. No respiratory distress.      Breath sounds: Normal breath sounds.   Abdominal:      Palpations: Abdomen is soft.      Tenderness: There is no abdominal tenderness.   Musculoskeletal:         General: No swelling or tenderness.      Cervical back: Neck supple. No rigidity.   Skin:     General: Skin is warm and dry.   Neurological:      General: No focal deficit present.      Mental Status: She is alert and oriented to person, place, and time.   Psychiatric:         Mood and Affect: Mood normal.         Behavior: Behavior normal.       Significant Labs: All pertinent labs within the past 24 hours have been reviewed.    Significant Imaging: I have reviewed all pertinent imaging results/findings within the past 24 hours.      Assessment/Plan:      * Diabetic ketoacidosis without coma associated with type 2 diabetes mellitus  -Admitted to inpatient status  -Noted recent diagnosis of DM by PCP and was hoping to be able to control with diet, metformin and jardiance.  -On admit glucose 556, anion gap 22, beta-hydroxybutyrate 5.3, pH 7.0 and bicarb 7.  -A1c 11.9  DKA now resolved with closure of AG.  Transitioned to SQ insulin.  Diabetic diet with insulin sliding scale.  Diabetic teaching.      Debility  -Consult pt/ot    Abnormal urinalysis  NO UTI  Stop ABX    Tachycardia  -Still with significant tachycardia  -Sinus on EKG and no chest pain  -Troponin negative  -Procalcitonin and lactic acid are normal.  WBC was hemoconcentrated and has normalized.  Stopped vancomycin - continued zosyn for questionable UTI.  UCx only growing <50k yeast.  Stop zosyn.  -Could be secondary to her severe  dehydration and acidosis, but given malignancy and very recent hysterectomy I am concerned about possible VTE.  No leg swelling or tenderness.  CTA negative for PE.  -Continue prophylactic lovenox.  Add B blocker.        Slurred speech  -Noted slurred speech, lethargy and generalized weakness on admit.  Still weak but no-longer lethargic and speech has almost completely normalized (I cannot appreciate slurring now)  -No focal deficits on exam and noted oral thrush.  -Suspect due to DKA, severe dehydration and oral thrush  -Started nystatin swish/swallow for thrush  Significant thrush.  Will add Fluconazole.  -Consult pt/ot    Endometrial adenocarcinoma  -S/p recent hysterectomy  -Incisions look good and no abdominal pain.  -Still awaiting to see if LN had cancer/mets, f/u path outpt    Essential hypertension  -BP solidly normal but rather tachycardic  -Holding home losartan  Start B blocker    Type 2 diabetes mellitus without complication, without long-term current use of insulin  -Treatment as above for DKA      VTE Risk Mitigation (From admission, onward)         Ordered     enoxaparin injection 40 mg  Daily         11/27/22 1637     IP VTE LOW RISK PATIENT  Once         11/27/22 1439                Discharge Planning   COLE: 11/30/2022     Code Status: Not on file   Is the patient medically ready for discharge?:     Reason for patient still in hospital (select all that apply): Treatment and PT / OT recommendations  Discharge Plan A: Home with family                  Todd Mcclelland III, MD  Department of Hospital Medicine   [facility]

## 2022-11-30 NOTE — PLAN OF CARE
Problem: Adult Inpatient Plan of Care  Goal: Plan of Care Review  Outcome: Ongoing, Progressing  Flowsheets (Taken 11/30/2022 0815)  Plan of Care Reviewed With: patient  Goal: Patient-Specific Goal (Individualized)  Outcome: Ongoing, Progressing     Problem: Diabetes Comorbidity  Goal: Blood Glucose Level Within Targeted Range  Outcome: Ongoing, Progressing  Intervention: Monitor and Manage Glycemia  Flowsheets (Taken 11/30/2022 0815)  Glycemic Management:   blood glucose monitored   carbohydrate replacement provided   oral hydration promoted   supplemental insulin given     Problem: Adult Inpatient Plan of Care  Goal: Plan of Care Review  Outcome: Ongoing, Progressing  Flowsheets (Taken 11/30/2022 0815)  Plan of Care Reviewed With: patient     Problem: Adult Inpatient Plan of Care  Goal: Plan of Care Review  Outcome: Ongoing, Progressing  Flowsheets (Taken 11/30/2022 0815)  Plan of Care Reviewed With: patient     Problem: Adult Inpatient Plan of Care  Goal: Patient-Specific Goal (Individualized)  Outcome: Ongoing, Progressing     Problem: Adult Inpatient Plan of Care  Goal: Patient-Specific Goal (Individualized)  Outcome: Ongoing, Progressing     Problem: Diabetes Comorbidity  Goal: Blood Glucose Level Within Targeted Range  Outcome: Ongoing, Progressing  Intervention: Monitor and Manage Glycemia  Flowsheets (Taken 11/30/2022 0815)  Glycemic Management:   blood glucose monitored   carbohydrate replacement provided   oral hydration promoted   supplemental insulin given     Problem: Diabetes Comorbidity  Goal: Blood Glucose Level Within Targeted Range  Outcome: Ongoing, Progressing     Problem: Diabetes Comorbidity  Goal: Blood Glucose Level Within Targeted Range  Intervention: Monitor and Manage Glycemia  Flowsheets (Taken 11/30/2022 0815)  Glycemic Management:   blood glucose monitored   carbohydrate replacement provided   oral hydration promoted   supplemental insulin given     Problem: Diabetes  Comorbidity  Goal: Blood Glucose Level Within Targeted Range  Intervention: Monitor and Manage Glycemia  Flowsheets (Taken 11/30/2022 0815)  Glycemic Management:   blood glucose monitored   carbohydrate replacement provided   oral hydration promoted   supplemental insulin given

## 2022-11-30 NOTE — NURSING
Pt lying in bed. Denies any pain. Oriented to room, call light in reach. Telebox [phone]. NS infusing at 125mL/hr. Will continue to monitor pt.

## 2022-11-30 NOTE — PLAN OF CARE
Problem: Occupational Therapy  Goal: Occupational Therapy Goal  Description: Goals to be met by: 12/12/22     Patient will increase functional independence with ADLs by performing:    UE Dressing with Modified [place].  LE Dressing with Modified [place].  Grooming while standing at sink with Modified [place].  Toileting from toilet with Modified [place] for hygiene and clothing management.   Supine to sit with Modified [place].  Step transfer with Modified [place]  Toilet transfer to toilet with Modified [place].  Upper extremity exercise program x15 reps per handout, with independence.    Outcome: Ongoing, Progressing     Pt progressing well towards goals. Supervision with RW for standing grooming ADLs at the sink.

## 2022-11-30 NOTE — PROGRESS NOTES
[facility] unable to schedule patient hospital follow up with her doctor because there was not any openings.  at Dr. Umana office  ask me to note to have the patient  call [phone] at the [place] office to schedule her follow up.

## 2022-11-30 NOTE — ASSESSMENT & PLAN NOTE
-Admitted to inpatient status  -Noted recent diagnosis of DM by PCP and was hoping to be able to control with diet, metformin and jardiance.  -On admit glucose 556, anion gap 22, beta-hydroxybutyrate 5.3, pH 7.0 and bicarb 7.  -A1c 11.9  DKA now resolved with closure of AG.  Transitioned to SQ insulin.  Diabetic diet with insulin sliding scale.  Diabetic teaching.    -Will need endocrinology referral to [facility] as that is where her insurance is covered on d/c.

## 2022-12-01 VITALS
WEIGHT: 156.94 LBS | HEART RATE: 99 BPM | SYSTOLIC BLOOD PRESSURE: 118 MMHG | TEMPERATURE: 98 F | OXYGEN SATURATION: 97 % | DIASTOLIC BLOOD PRESSURE: 69 MMHG | BODY MASS INDEX: 27.81 KG/M2 | HEIGHT: 63 IN | RESPIRATION RATE: 18 BRPM

## 2022-12-01 LAB
ANION GAP SERPL CALC-SCNC: 9 MMOL/L (ref 8–16)
BACTERIA BLD CULT: NORMAL
BACTERIA BLD CULT: NORMAL
BASOPHILS # BLD AUTO: 0.02 K/UL (ref 0–0.2)
BASOPHILS NFR BLD: 0.3 % (ref 0–1.9)
BUN SERPL-MCNC: 7 MG/DL (ref 6–20)
CALCIUM SERPL-MCNC: 8.8 MG/DL (ref 8.7–10.5)
CHLORIDE SERPL-SCNC: 106 MMOL/L (ref 95–110)
CHOLEST SERPL-MCNC: 120 MG/DL (ref 120–199)
CHOLEST/HDLC SERPL: 2.7 {RATIO} (ref 2–5)
CO2 SERPL-SCNC: 29 MMOL/L (ref 23–29)
CREAT SERPL-MCNC: 0.5 MG/DL (ref 0.5–1.4)
DIFFERENTIAL METHOD: ABNORMAL
EOSINOPHIL # BLD AUTO: 0.1 K/UL (ref 0–0.5)
EOSINOPHIL NFR BLD: 1.6 % (ref 0–8)
ERYTHROCYTE [DISTWIDTH] IN BLOOD BY AUTOMATED COUNT: 13.3 % (ref 11.5–14.5)
EST. GFR  (NO RACE VARIABLE): >60 ML/MIN/1.73 M^2
GLUCOSE SERPL-MCNC: 170 MG/DL (ref 70–110)
HCT VFR BLD AUTO: 38.4 % (ref 37–48.5)
HDLC SERPL-MCNC: 44 MG/DL (ref 40–75)
HDLC SERPL: 36.7 % (ref 20–50)
HGB BLD-MCNC: 12.2 G/DL (ref 12–16)
IMM GRANULOCYTES # BLD AUTO: 0.2 K/UL (ref 0–0.04)
IMM GRANULOCYTES NFR BLD AUTO: 2.9 % (ref 0–0.5)
LDLC SERPL CALC-MCNC: 41.4 MG/DL (ref 63–159)
LYMPHOCYTES # BLD AUTO: 2.7 K/UL (ref 1–4.8)
LYMPHOCYTES NFR BLD: 39.8 % (ref 18–48)
MAGNESIUM SERPL-MCNC: 2 MG/DL (ref 1.6–2.6)
MCH RBC QN AUTO: 29 PG (ref 27–31)
MCHC RBC AUTO-ENTMCNC: 31.8 G/DL (ref 32–36)
MCV RBC AUTO: 91 FL (ref 82–98)
MONOCYTES # BLD AUTO: 0.5 K/UL (ref 0.3–1)
MONOCYTES NFR BLD: 7.9 % (ref 4–15)
NEUTROPHILS # BLD AUTO: 3.2 K/UL (ref 1.8–7.7)
NEUTROPHILS NFR BLD: 47.5 % (ref 38–73)
NONHDLC SERPL-MCNC: 76 MG/DL
NRBC BLD-RTO: 0 /100 WBC
PLATELET # BLD AUTO: 219 K/UL (ref 150–450)
PMV BLD AUTO: 9.7 FL (ref 9.2–12.9)
POCT GLUCOSE: 161 MG/DL (ref 70–110)
POCT GLUCOSE: 198 MG/DL (ref 70–110)
POTASSIUM SERPL-SCNC: 3 MMOL/L (ref 3.5–5.1)
RBC # BLD AUTO: 4.2 M/UL (ref 4–5.4)
SODIUM SERPL-SCNC: 144 MMOL/L (ref 136–145)
TRIGL SERPL-MCNC: 173 MG/DL (ref 30–150)
WBC # BLD AUTO: 6.83 K/UL (ref 3.9–12.7)

## 2022-12-01 PROCEDURE — 25000003 PHARM REV CODE 250: Performed by: HOSPITALIST

## 2022-12-01 PROCEDURE — 80048 BASIC METABOLIC PNL TOTAL CA: CPT | Performed by: NURSE PRACTITIONER

## 2022-12-01 PROCEDURE — 36415 COLL VENOUS BLD VENIPUNCTURE: CPT | Performed by: NURSE PRACTITIONER

## 2022-12-01 PROCEDURE — 63700000 PHARM REV CODE 250 ALT 637 W/O HCPCS: Performed by: HOSPITALIST

## 2022-12-01 PROCEDURE — 97535 SELF CARE MNGMENT TRAINING: CPT | Mod: CO

## 2022-12-01 PROCEDURE — 97116 GAIT TRAINING THERAPY: CPT | Mod: CQ

## 2022-12-01 PROCEDURE — 85025 COMPLETE CBC W/AUTO DIFF WBC: CPT | Performed by: HOSPITALIST

## 2022-12-01 PROCEDURE — 97110 THERAPEUTIC EXERCISES: CPT | Mod: CQ

## 2022-12-01 PROCEDURE — 83735 ASSAY OF MAGNESIUM: CPT | Performed by: NURSE PRACTITIONER

## 2022-12-01 PROCEDURE — 36415 COLL VENOUS BLD VENIPUNCTURE: CPT | Performed by: HOSPITALIST

## 2022-12-01 PROCEDURE — 25000003 PHARM REV CODE 250: Performed by: NURSE PRACTITIONER

## 2022-12-01 PROCEDURE — 80061 LIPID PANEL: CPT | Performed by: NURSE PRACTITIONER

## 2022-12-01 RX ORDER — POTASSIUM CHLORIDE 20 MEQ/1
40 TABLET, EXTENDED RELEASE ORAL
Status: COMPLETED | OUTPATIENT
Start: 2022-12-01 | End: 2022-12-01

## 2022-12-01 RX ORDER — INSULIN ASPART 100 [IU]/ML
5 INJECTION, SOLUTION INTRAVENOUS; SUBCUTANEOUS
Qty: 15 ML | Refills: 1 | Status: SHIPPED | OUTPATIENT
Start: 2022-12-01 | End: 2023-12-01

## 2022-12-01 RX ORDER — DEXTROSE 4 G
TABLET,CHEWABLE ORAL
Qty: 1 EACH | Refills: 0 | Status: SHIPPED | OUTPATIENT
Start: 2022-12-01

## 2022-12-01 RX ORDER — LANCETS
1 EACH MISCELLANEOUS
Qty: 200 EACH | Refills: 0 | Status: SHIPPED | OUTPATIENT
Start: 2022-12-01

## 2022-12-01 RX ADMIN — POTASSIUM PHOSPHATE, MONOBASIC 500 MG: 500 TABLET, SOLUBLE ORAL at 08:12

## 2022-12-01 RX ADMIN — ATORVASTATIN CALCIUM 40 MG: 40 TABLET, FILM COATED ORAL at 08:12

## 2022-12-01 RX ADMIN — ASPIRIN 81 MG: 81 TABLET, DELAYED RELEASE ORAL at 08:12

## 2022-12-01 RX ADMIN — INSULIN ASPART 5 UNITS: 100 INJECTION, SOLUTION INTRAVENOUS; SUBCUTANEOUS at 12:12

## 2022-12-01 RX ADMIN — NYSTATIN 500000 UNITS: 100000 SUSPENSION ORAL at 08:12

## 2022-12-01 RX ADMIN — POTASSIUM CHLORIDE 40 MEQ: 1500 TABLET, EXTENDED RELEASE ORAL at 10:12

## 2022-12-01 RX ADMIN — POTASSIUM CHLORIDE 40 MEQ: 1500 TABLET, EXTENDED RELEASE ORAL at 12:12

## 2022-12-01 RX ADMIN — NYSTATIN 500000 UNITS: 100000 SUSPENSION ORAL at 01:12

## 2022-12-01 RX ADMIN — PANTOPRAZOLE SODIUM 40 MG: 40 TABLET, DELAYED RELEASE ORAL at 08:12

## 2022-12-01 RX ADMIN — MUPIROCIN: 20 OINTMENT TOPICAL at 09:12

## 2022-12-01 RX ADMIN — METOPROLOL SUCCINATE 25 MG: 25 TABLET, EXTENDED RELEASE ORAL at 08:12

## 2022-12-01 RX ADMIN — FLUCONAZOLE 100 MG: 100 TABLET ORAL at 08:12

## 2022-12-01 RX ADMIN — INSULIN ASPART 5 UNITS: 100 INJECTION, SOLUTION INTRAVENOUS; SUBCUTANEOUS at 08:12

## 2022-12-01 NOTE — PLAN OF CARE
[facility] - Med Surg  Discharge Final Note  TN sent a secure chat to med surg nurse Dee that the    patient is cleared for discharge from case management's viewpoint.  [facility] will call patient.  Primary Care Provider: Dada Umana MD    Expected Discharge Date: 12/1/2022    Final Discharge Note (most recent)       Final Note - 12/01/22 1205          Final Note    Assessment Type Final Discharge Note     Anticipated Discharge Disposition Home-Health Care Svc   home health only preference [facility] to call patient    What phone number can be called within the next 1-3 days to see how you are doing after discharge? --   see chart    Hospital Resources/Appts/Education Provided Patient refused appointment set-up;Appointments scheduled and added to AVS;Appointments scheduled by Navigator/Coordinator;Post-Acute resouces added to AVS        Post-Acute Status    Post-Acute Authorization Home Health     Home Health Status Pending Payor Review     Discharge Delays None known at this time                     Important Message from Medicare             Contact Info       Lyndsay Garland MD    [address] [place] [address] [id number] [phone]       Next Steps: Follow up on 11/30/2022    Instructions: Please call [phone] to schedule a hospital follow up    [facility]   Specialty: Home Health Services    [address] [place] [id number]   Phone: [phone]       Next Steps: Follow up    Instructions: Home Health

## 2022-12-01 NOTE — PT/OT/SLP PROGRESS
Occupational Therapy   Treatment    Name: Aleyda Benitez  MRN: [id number]  Admitting Diagnosis:  Diabetic ketoacidosis without coma associated with type 2 diabetes mellitus       Recommendations:     Discharge Recommendations: home health OT (with family assistance)  Discharge Equipment Recommendations:  bath bench (long-handled sponge) Pt reports that she has a standard walker at home, recommend rolling walker. Discussed with ALEKSEY Romero   Barriers to discharge: none       Assessment:     Aleyda Benitez is a 62 y.o. female with a medical diagnosis of Diabetic ketoacidosis without coma associated with type 2 diabetes mellitus.  She presents with the following performance deficits affecting function: weakness, impaired endurance, gait instability, impaired balance.     Rehab Prognosis:  Good; patient would benefit from acute skilled OT services to address these deficits and reach maximum level of function.       Plan:     Patient to be seen 3 x/week to address the above listed problems via self-care/home management, therapeutic activities, therapeutic exercises  Plan of Care Expires: 12/12/22  Plan of Care Reviewed with: patient, spouse    Subjective     Pain/Comfort:  Pain Rating 1: 0/10    Objective:     Communicated with: Nurse prior to session.  Patient found up in chair with peripheral IV, telemetry upon OT entry to room.    General Precautions: Standard, fall, diabetic   Orthopedic Precautions:N/A   Braces: N/A  Respiratory Status: Room air     Occupational Performance:     Bed Mobility:    Not performed    Functional Mobility/TransBed mobility, functional transfer/mobility , and ADL completed as noted above.   Pt educated on safety awareness with all OOB mobility and ADL .   Encouraged :  Patient completed Sit <> Stand Transfer with modified independence and supervision  with  rolling walker   Patient completed Toilet Transfer Step Transfer technique with modified independence and supervision with  rolling  walker  Functional Mobility: Pt able to perform household mobility within room S,RW.     Activities of Daily Living:  Grooming: modified independence standing at sink to perform oral care and wash face      [facility] 6 Click ADL: 22    Treatment & Education:  Functional transfer/mobility and ADL completed as noted above.   Pt educated on safety awareness with all OOB mobility and ADL .   Encouraged increased OOB activity throughout day to maximize recovery.  Pt educated on BUE AROM HEP via handout, verbal instruction, and demonstration: digit I-V flex/ext, wrist flex/ext, elbow flex/ext, forearm pronation/supination, forward punches, shoulder elevation/depression, shoulder flex/ext, shoulder abd/add. Pt instructed to perform x10-15 reps, 2-3/day as tolerated to increase UB strength and endurance needed for ADL and mobility. Pt verbalizes understanding.  Pt able to return demo and verbalizes understanding.  Education on DME handout and TTB education    Patient left up in chair with all lines intact, call button in reach, nurse notified, and family present    GOALS:   Multidisciplinary Problems       Occupational Therapy Goals          Problem: Occupational Therapy    Goal Priority Disciplines Outcome Interventions   Occupational Therapy Goal     OT, PT/OT Ongoing, Progressing    Description: Goals to be met by: 12/12/22     Patient will increase functional independence with ADLs by performing:    UE Dressing with Modified [place].  LE Dressing with Modified [place].  Grooming while standing at sink with Modified [place].  Toileting from toilet with Modified [place] for hygiene and clothing management.   Supine to sit with Modified [place].  Step transfer with Modified [place]  Toilet transfer to toilet with Modified [place].  Upper extremity exercise program x15 reps per handout, with independence.                         Time Tracking:     OT Date of Treatment: 12/01/22  OT Start Time:  1148  OT Stop Time: 1203  OT Total Time (min): 15 min    Billable Minutes:Self Care/Home Management 15    OT/CHIQUITA: CHIQUITA     CHIQUITA Visit Number: 1    12/1/2022

## 2022-12-01 NOTE — PLAN OF CARE
Patient has r/w and bsc at home. Patient will buy TTB herself.   TN sending home health referral to [facility] as patient preference.

## 2022-12-01 NOTE — PT/OT/SLP PROGRESS
"Physical Therapy Treatment    Patient Name:  Aleyda Benitez   MRN:  [id number]    Recommendations:     Discharge Recommendations:  home health PT (Family support)   Discharge Equipment Recommendations:  (Ongoing assessemnt pending pt progress, RW, BSC, TTB?)   Barriers to discharge: None    Assessment:     Aleyda Benitez is a 62 y.o. female admitted with a medical diagnosis of Diabetic ketoacidosis without coma associated with type 2 diabetes mellitus.  She presents with the following impairments/functional limitations:  weakness, impaired endurance, impaired functional mobility, gait instability, impaired balance, decreased lower extremity function, decreased upper extremity function, impaired cardiopulmonary response to activity, impaired skin, edema.    Pt demo improvement with her ambulation today with further distance and less assistance. Pt sated she is feeling much better with less pain. -130 bpm SpO2 98% on RA. Pt is [facility] post treatment, Spouse present, nurse notified.    Rehab Prognosis: Good; patient would benefit from acute skilled PT services to address these deficits and reach maximum level of function.    Recent Surgery: * No surgery found *      Plan:     During this hospitalization, patient to be seen  (5-6x/wk) to address the identified rehab impairments via gait training, therapeutic activities, therapeutic exercises and progress toward the following goals:    Plan of Care Expires:  12/12/22    Subjective     Chief Complaint: "Can I use toilet before we start?"  Patient/Family Comments/goals: Pt agreed to participate.  Pain/Comfort:  Pain Rating 1: 0/10  Pain Rating Post-Intervention 1: 0/10      Objective:     Communicated with nurse Price prior to session.  Patient found sitting edge of bed with peripheral IV, telemetry, Spouse present upon PT entry to room.     General Precautions: Standard, fall, diabetic   Orthopedic Precautions: (Abdominal precaution)   Braces:    Respiratory Status: " Room air     Functional Mobility:  Transfers:   Gait Belt don prior OOB activity  Sit to Stand: from EOB, Toilet, BS Chair with supervision with rolling walker  Toilet Transfer: stand by assistance with rolling walker using Step Transfer  Patient ambulated ~10, ~50, ~75  feet on level tile with Rolling Walker with stand by assistance. Pt with demonstrating a reciprocal gait with min lateral sway, min unsteadiness, decreased sonny and decreased step length. Impairments contributing to gait deviations include impaired balance and decreased strength; v/c for AD management, straight her L foot, increase step length, and to allow self corrections.  Balance: Good Sitting; Fair/Fair+ Standing      AM-PAC 6 CLICK MOBILITY  Turning over in bed (including adjusting bedclothes, sheets and blankets)?: 3  Sitting down on and standing up from a chair with arms (e.g., wheelchair, bedside commode, etc.): 4  Moving from lying on back to sitting on the side of the bed?: 3  Moving to and from a bed to a chair (including a wheelchair)?: 4  Need to walk in hospital room?: 3  Climbing 3-5 steps with a railing?: 2  Basic Mobility Total Score: 19       Treatment & Education:  Provided pt handouts and educated pt on BLE ex, PLB tech, Energy Conservation, benefits of OOB activity and performing BLE ex throughout the day, pt verbalized understanding and returned demo with satisfactory.  BLE ex in seated 10 reps x 2 : AP, LAQ, PS    Patient left up in chair on green cushion with tray table near by, call button in reach, nurse notified, and Spouse present.    GOALS:   Multidisciplinary Problems       Physical Therapy Goals          Problem: Physical Therapy    Goal Priority Disciplines Outcome Goal Variances Interventions   Physical Therapy Goal     PT, PT/OT Ongoing, Progressing     Description: Goals to be met by: 22     Patient will increase functional independence with mobility by performin. Supine to sit with Modified  [place]  2. Sit to stand transfer with Modified [place]  3. Gait  x  feet with Modified [place] using Rolling Walker.   4. Lower extremity exercise program x10 reps per handout, with supervision                         Time Tracking:     PT Received On: 12/01/22  PT Start Time: 0920     PT Stop Time: 0945  PT Total Time (min): 25 min     Billable Minutes: Gait Training 12 min and Therapeutic Exercise 13 min    Treatment Type: Treatment  PT/PTA: PTA     PTA Visit Number: 2     12/01/2022

## 2022-12-01 NOTE — CONSULTS
Thank you for your consult to [facility]. We have reviewed the patient chart. This patient does meet criteria for [facility] service at this time. Will assume care on 12/01/22 at 6AM

## 2022-12-01 NOTE — PLAN OF CARE
Problem: Physical Therapy  Goal: Physical Therapy Goal  Description: Goals to be met by: 22     Patient will increase functional independence with mobility by performin. Supine to sit with Modified [place]  2. Sit to stand transfer with Modified [place]  3. Gait  x  feet with Modified [place] using Rolling Walker.   4. Lower extremity exercise program x10 reps per handout, with supervision    Outcome: Ongoing, Progressing   Pt demo improvement with her ambulation today with further distance and less assistance. Pt sated she is feeling much better with less pain. -130 bpm SpO2 98% on RA. Pt with no c/o dizziness, headache, or SOB today. Pt is [facility] post treatment, Spouse present, nurse notified.

## 2022-12-01 NOTE — PLAN OF CARE
Problem: Occupational Therapy  Goal: Occupational Therapy Goal  Description: Goals to be met by: 12/12/22     Patient will increase functional independence with ADLs by performing:    UE Dressing with Modified [place].  LE Dressing with Modified [place].  Grooming while standing at sink with Modified [place].  Toileting from toilet with Modified [place] for hygiene and clothing management.   Supine to sit with Modified [place].  Step transfer with Modified [place]  Toilet transfer to toilet with Modified [place].  Upper extremity exercise program x15 reps per handout, with independence.    Outcome: Ongoing, Progressing

## 2022-12-02 NOTE — DISCHARGE SUMMARY
[facility] Medicine  Discharge Summary      Patient Name: Aleyda Benitez  MRN: [id number]  Patient Class: IP- Inpatient  Admission Date: 11/27/2022  Hospital Length of Stay: 4 days  Discharge Date and Time: 12/1/2022  4:46 PM  Attending Physician: No att. providers found   Discharging Provider: Ainsley Salinas NP  Primary Care Provider: Dada Umana MD      HPI:     Aleyda Benitez is a 62 y.o. female who DM2, HTN, Obesity, OP, and Endometrial Adenocarcinoma has no past medical history on file, presented to the ED with Generalized Fatigue and High Home Glucoses.  Patient was just diagnosed with endometrial carcinoma a couple weeks ago, she had her uterus removed 6 days ago.  Does not sound like she is had a PET scan yet, lymph node biopsy still pending pathology at this point.  Patient was unaware she was diabetic before a couple weeks ago, when doing workup for the surgery.  She was starting on two oral diabetic medications.  Patient was unable to get sugar down at her house, and was brought into the emergency room after she was progressively worsening, including generalized weakness, hypotension, slurred speech.   recognized the slurring of speech last night, but got progressively worse this morning.  She has not had a bowel movement since her procedure 6 days ago.  She has polyuria and polydipsia.  Patient denies any abdominal discomfort associated with the procedure, denies protrusion.  She denies chest pain or shortness of breath.    In the ED, patient was found to be in DKA, with a pH of 7.075, bicarb 7, anion gap 22 and beta hydroxybutyrate 5.3.  She is severely dehydrated, with concentrated CBC.  Glucose 556.  Patient given fluids started insulin drip and placed in ICU.    Home meds:  Losartan 50 mg once daily  Metoprolol succ 50 mg once daily  Metformin 500 mg q24 once daily  Empagliflozin 10 mg once  Glargine 15U nightly (?)  On Care everywhere  Medroxyprogesterone 10 mg        * No surgery found *      Hospital Course:   62 year old woman with uterine cancer s/p recent hysterectomy, recent diagnosis of diabetes mellitus, HTN and obesity presented for evaluation of fatigue and shortness of breath.  She was diagnosed with DKA, possible UTI and debility.  DKA being treated in standard fashion.  Has significant sinus tachycardia which could be due to her acidosis and dehydration but I'm concerned for possible PE given her malignancy and recent surgery.  D-dimer is elevated so checking CTA chest.  CTA negative for PE.  Had slurred speech on admit which has resolved.  Noted oral thrush so started nystatin.  Significant thrush and discomfort and added Fluconazole.  Has abnormal UA but urine cutlure only showing <50K yeast.  Stopped Zosyn.  PT/OT consulted and recommend HH at discharge. Patient with stable blood sugars. She is medically stable for DC. Discussed outpatient POC. Will refer to endocrinology and diabetic educator for DM type 2 education. She was initiated on scheduled meal time insulin and instructed to check glucose prior to each meal.         Goals of Care Treatment Preferences:  Code Status: Full Code      Consults:   Consults (From admission, onward)        Status Ordering Provider     Inpatient virtual consult to Hospital Medicine  Once        Provider:  (Not yet assigned)    Completed CYNTHIA SMALLWOOD III          No new Assessment & Plan notes have been filed under this hospital service since the last note was generated.  Service: Hospital Medicine    Final Active Diagnoses:    Diagnosis Date Noted POA    PRINCIPAL PROBLEM:  Diabetic ketoacidosis without coma associated with type 2 diabetes mellitus [E11.10] 11/27/2022 Yes    Tachycardia [R00.0] 11/28/2022 Yes    Abnormal urinalysis [R82.90] 11/28/2022 Yes    Debility [R53.81] 11/28/2022 Yes    Type 2 diabetes mellitus without complication, without long-term current use of insulin [E11.9] 11/27/2022 Yes     "Essential hypertension [I10] 11/27/2022 Yes    Endometrial adenocarcinoma [C54.1] 11/27/2022 Yes    Slurred speech [R47.81] 11/27/2022 Yes      Problems Resolved During this Admission:       Discharged Condition: stable    Disposition: Home-Health Care [facility]    Follow Up:   Follow-up Information     Lyndsay Garland MD Follow up on 11/30/2022.    Why: Please call [phone] to schedule a hospital follow up  Contact information:  [address] [address]  [phone]           [facility] Follow up.    Specialty: Home Health Services  Why: Home Health  Contact information:  [address] [facility] [id number] [phone]                       Patient Instructions:      WALKER FOR HOME USE     Order Specific Question Answer Comments   Type of Walker: Adult (5'4"-6'6")    With wheels? Yes    Height: 5' 2.5" (1.588 m)    Weight: 71.2 kg (156 lb 15.5 oz)    Length of need (1-99 months): 99    Does patient have medical equipment at home? walker, rolling    Please check all that apply: Patient's condition impairs ambulation.    Please check all that apply: Walker will be used for gait training.    Please check all that apply: Patient needs help to get in and out of chair.      Ambulatory referral/consult to Endocrinology   Standing Status: Future   Referral Priority: Routine Referral Type: Consultation   Requested Specialty: Endocrinology   Number of Visits Requested: 1     Ambulatory referral/consult to Diabetes Education   Standing Status: Future   Referral Priority: Routine Referral Type: Consultation   Referral Reason: Specialty Services Required   Requested Specialty: Diabetes   Number of Visits Requested: 1 Expiration Date: 12/01/23     Ambulatory referral/consult to Home Health   Standing Status: Future   Referral Priority: Routine Referral Type: Home Health   Referral Reason: Specialty Services Required   Requested Specialty: Home Health Services   Number of Visits Requested: 1 "     Diet diabetic     Notify your health care provider if you experience any of the following:  difficulty breathing or increased cough     Notify your health care provider if you experience any of the following:  persistent nausea and vomiting or diarrhea     Notify your health care provider if you experience any of the following:  temperature >100.4     Notify your health care provider if you experience any of the following:  persistent dizziness, light-headedness, or visual disturbances     Notify your health care provider if you experience any of the following:  increased confusion or weakness     Activity as tolerated       Significant Diagnostic Studies: Labs:   BMP:   Recent Labs   Lab 12/01/22  0758   *      K 3.0*      CO2 29   BUN 7   CREATININE 0.5   CALCIUM 8.8   MG 2.0   , CBC   Recent Labs   Lab 12/01/22  0431   WBC 6.83   HGB 12.2   HCT 38.4      , Lipid Panel   Lab Results   Component Value Date    CHOL 120 12/01/2022    HDL 44 12/01/2022    LDLCALC 41.4 (L) 12/01/2022    TRIG 173 (H) 12/01/2022    CHOLHDL 36.7 12/01/2022    and A1C:   Recent Labs   Lab 11/16/22  1021 11/27/22  1326   HGBA1C 11.9* 10.5*     Cardiac Graphics: Echocardiogram: Transthoracic echo (TTE) complete (Cupid Only): No results found for this or any previous visit.    Pending Diagnostic Studies:     None         Medications:  Reconciled Home Medications:      Medication List      START taking these medications    ACCU-CHEK GUIDE GLUCOSE METER Misc  Generic drug: blood-glucose meter  Test blood Sugar 2 hours after meals and at bedtime.     ACCU-CHEK GUIDE TEST STRIPS Strp  Generic drug: blood sugar diagnostic  Test blood Sugar 2 hours after meals and at bedtime.     ACCU-CHEK SOFTCLIX LANCETS Misc  Generic drug: lancets  Test blood Sugar 2 hours after meals and at bedtime.     NovoLOG Flexpen U-100 Insulin 100 unit/mL (3 mL) Inpn pen  Generic drug: insulin aspart U-100  Inject 5 Units into the skin 3  "(three) times daily with meals.        CONTINUE taking these medications    insulin glargine 100 units/mL SubQ pen  Inject 15 Units into the skin.     JARDIANCE 10 mg tablet  Generic drug: empagliflozin  Take 10 mg by mouth.     losartan 50 MG tablet  Commonly known as: COZAAR  Take 50 mg by mouth.     metFORMIN 500 MG ER 24hr tablet  Commonly known as: GLUCOPHAGE-XR  Take 500 mg by mouth.     metoprolol succinate 50 MG 24 hr tablet  Commonly known as: TOPROL-XL  TAKE 1 TABLET BY MOUTH EVERY MORNING AND AT BEDTIME        ASK your doctor about these medications    NOVOFINE 32 32 gauge x 1/4" Ndle  Generic drug: pen needle, diabetic  Use with insulin 3 times daily            Indwelling Lines/Drains at time of discharge:   Lines/Drains/Airways     Drain  Duration           Female External Urinary Catheter 11/29/22 2015 2 days                Time spent on the discharge of patient: 50 minutes         The attending portion of this evaluation, treatment, and documentation was performed per Ainsley Salinas NP via Telemedicine AudioVisual using the secure [employer] software platform with 2 way audio/video. The provider was located off-site and the patient is located in the hospital. The aforementioned video software was utilized to document the relevant history and physical exam    Ainsley Salinas NP  Department of Hospital Medicine  [facility]  "

## 2023-02-07 NOTE — PLAN OF CARE
Patient free from falls and injuries throughout shift.  AAO and VSS.  Patient denies CP and SOB.  Blood glucose managed insulin; hourly glucose.  Patient continues on Insulin @ 2.75 mL/hour. D5 and 0.45% NS @ 125 mL/h.Phosphorus replaced. VTE prophylaxis. Patient resting well at this time.  Plan of care discussed with patient.  Patient verbalizes understanding.  Will continue to monitor.      [Negative] : Heme/Lymph

## 2023-03-03 ENCOUNTER — EXTERNAL HOME HEALTH (OUTPATIENT)
Dept: HOME HEALTH SERVICES | Facility: HOSPITAL | Age: 63
End: 2023-03-03
Payer: COMMERCIAL

## 2023-03-06 PROBLEM — E11.10 DIABETIC KETOACIDOSIS WITHOUT COMA ASSOCIATED WITH TYPE 2 DIABETES MELLITUS: Status: RESOLVED | Noted: 2022-11-27 | Resolved: 2023-03-06
